# Patient Record
Sex: FEMALE | Race: WHITE | NOT HISPANIC OR LATINO | Employment: UNEMPLOYED | URBAN - METROPOLITAN AREA
[De-identification: names, ages, dates, MRNs, and addresses within clinical notes are randomized per-mention and may not be internally consistent; named-entity substitution may affect disease eponyms.]

---

## 2024-04-15 ENCOUNTER — APPOINTMENT (OUTPATIENT)
Dept: ULTRASOUND IMAGING | Facility: CLINIC | Age: 29
End: 2024-04-15
Attending: EMERGENCY MEDICINE
Payer: MEDICAID

## 2024-04-15 ENCOUNTER — HOSPITAL ENCOUNTER (EMERGENCY)
Facility: CLINIC | Age: 29
Discharge: HOME | End: 2024-04-16
Attending: EMERGENCY MEDICINE | Admitting: EMERGENCY MEDICINE
Payer: MEDICAID

## 2024-04-15 DIAGNOSIS — O09.30 LATE PRENATAL CARE: ICD-10-CM

## 2024-04-15 DIAGNOSIS — Z3A.30 30 WEEKS GESTATION OF PREGNANCY: ICD-10-CM

## 2024-04-15 DIAGNOSIS — F19.11 HISTORY OF SUBSTANCE ABUSE (H): ICD-10-CM

## 2024-04-15 DIAGNOSIS — R82.71 ASYMPTOMATIC BACTERIURIA DURING PREGNANCY: ICD-10-CM

## 2024-04-15 DIAGNOSIS — O99.891 ASYMPTOMATIC BACTERIURIA DURING PREGNANCY: ICD-10-CM

## 2024-04-15 DIAGNOSIS — O21.9 NAUSEA AND VOMITING DURING PREGNANCY: ICD-10-CM

## 2024-04-15 LAB
ALBUMIN SERPL BCG-MCNC: 3.3 G/DL (ref 3.5–5.2)
ALBUMIN UR-MCNC: NEGATIVE MG/DL
ALP SERPL-CCNC: 92 U/L (ref 40–150)
ALT SERPL W P-5'-P-CCNC: 27 U/L (ref 0–50)
AMPHETAMINES UR QL SCN: ABNORMAL
ANION GAP SERPL CALCULATED.3IONS-SCNC: 12 MMOL/L (ref 7–15)
APPEARANCE UR: ABNORMAL
AST SERPL W P-5'-P-CCNC: 30 U/L (ref 0–45)
BARBITURATES UR QL SCN: ABNORMAL
BASOPHILS # BLD AUTO: 0.1 10E3/UL (ref 0–0.2)
BASOPHILS NFR BLD AUTO: 0 %
BENZODIAZ UR QL SCN: ABNORMAL
BILIRUB SERPL-MCNC: <0.2 MG/DL
BILIRUB UR QL STRIP: NEGATIVE
BUN SERPL-MCNC: 6.4 MG/DL (ref 6–20)
BZE UR QL SCN: ABNORMAL
CALCIUM SERPL-MCNC: 9 MG/DL (ref 8.6–10)
CANNABINOIDS UR QL SCN: ABNORMAL
CHLORIDE SERPL-SCNC: 103 MMOL/L (ref 98–107)
COLOR UR AUTO: YELLOW
CREAT SERPL-MCNC: 0.41 MG/DL (ref 0.51–0.95)
DEPRECATED HCO3 PLAS-SCNC: 24 MMOL/L (ref 22–29)
EGFRCR SERPLBLD CKD-EPI 2021: >90 ML/MIN/1.73M2
EOSINOPHIL # BLD AUTO: 0.3 10E3/UL (ref 0–0.7)
EOSINOPHIL NFR BLD AUTO: 2 %
ERYTHROCYTE [DISTWIDTH] IN BLOOD BY AUTOMATED COUNT: 13.5 % (ref 10–15)
FENTANYL UR QL: ABNORMAL
GLUCOSE SERPL-MCNC: 68 MG/DL (ref 70–99)
GLUCOSE UR STRIP-MCNC: NEGATIVE MG/DL
HCG UR QL: POSITIVE
HCT VFR BLD AUTO: 29.6 % (ref 35–47)
HGB BLD-MCNC: 9.9 G/DL (ref 11.7–15.7)
HGB UR QL STRIP: NEGATIVE
IMM GRANULOCYTES # BLD: 0.2 10E3/UL
IMM GRANULOCYTES NFR BLD: 2 %
KETONES UR STRIP-MCNC: NEGATIVE MG/DL
LEUKOCYTE ESTERASE UR QL STRIP: ABNORMAL
LYMPHOCYTES # BLD AUTO: 2.8 10E3/UL (ref 0.8–5.3)
LYMPHOCYTES NFR BLD AUTO: 20 %
MCH RBC QN AUTO: 28.9 PG (ref 26.5–33)
MCHC RBC AUTO-ENTMCNC: 33.4 G/DL (ref 31.5–36.5)
MCV RBC AUTO: 86 FL (ref 78–100)
MONOCYTES # BLD AUTO: 1.3 10E3/UL (ref 0–1.3)
MONOCYTES NFR BLD AUTO: 9 %
MUCOUS THREADS #/AREA URNS LPF: PRESENT /LPF
NEUTROPHILS # BLD AUTO: 9.3 10E3/UL (ref 1.6–8.3)
NEUTROPHILS NFR BLD AUTO: 67 %
NITRATE UR QL: NEGATIVE
NRBC # BLD AUTO: 0 10E3/UL
NRBC BLD AUTO-RTO: 0 /100
OPIATES UR QL SCN: ABNORMAL
PCP QUAL URINE (ROCHE): ABNORMAL
PH UR STRIP: 6.5 [PH] (ref 5–7)
PLATELET # BLD AUTO: 330 10E3/UL (ref 150–450)
POTASSIUM SERPL-SCNC: 3.4 MMOL/L (ref 3.4–5.3)
PROT SERPL-MCNC: 6.8 G/DL (ref 6.4–8.3)
RBC # BLD AUTO: 3.43 10E6/UL (ref 3.8–5.2)
RBC URINE: 11 /HPF
SODIUM SERPL-SCNC: 139 MMOL/L (ref 135–145)
SP GR UR STRIP: 1.02 (ref 1–1.03)
SQUAMOUS EPITHELIAL: 20 /HPF
UROBILINOGEN UR STRIP-MCNC: NORMAL MG/DL
WBC # BLD AUTO: 14 10E3/UL (ref 4–11)
WBC URINE: 9 /HPF

## 2024-04-15 PROCEDURE — 80053 COMPREHEN METABOLIC PANEL: CPT | Performed by: EMERGENCY MEDICINE

## 2024-04-15 PROCEDURE — 99285 EMERGENCY DEPT VISIT HI MDM: CPT | Mod: 25 | Performed by: EMERGENCY MEDICINE

## 2024-04-15 PROCEDURE — 85025 COMPLETE CBC W/AUTO DIFF WBC: CPT | Performed by: EMERGENCY MEDICINE

## 2024-04-15 PROCEDURE — 81001 URINALYSIS AUTO W/SCOPE: CPT | Performed by: STUDENT IN AN ORGANIZED HEALTH CARE EDUCATION/TRAINING PROGRAM

## 2024-04-15 PROCEDURE — 76805 OB US >/= 14 WKS SNGL FETUS: CPT

## 2024-04-15 PROCEDURE — 87086 URINE CULTURE/COLONY COUNT: CPT | Performed by: EMERGENCY MEDICINE

## 2024-04-15 PROCEDURE — 36415 COLL VENOUS BLD VENIPUNCTURE: CPT | Performed by: EMERGENCY MEDICINE

## 2024-04-15 PROCEDURE — 81025 URINE PREGNANCY TEST: CPT | Performed by: EMERGENCY MEDICINE

## 2024-04-15 PROCEDURE — 96361 HYDRATE IV INFUSION ADD-ON: CPT | Performed by: EMERGENCY MEDICINE

## 2024-04-15 PROCEDURE — 81001 URINALYSIS AUTO W/SCOPE: CPT | Mod: XU | Performed by: EMERGENCY MEDICINE

## 2024-04-15 PROCEDURE — 80307 DRUG TEST PRSMV CHEM ANLYZR: CPT | Performed by: EMERGENCY MEDICINE

## 2024-04-15 PROCEDURE — 99284 EMERGENCY DEPT VISIT MOD MDM: CPT | Performed by: EMERGENCY MEDICINE

## 2024-04-15 PROCEDURE — 96365 THER/PROPH/DIAG IV INF INIT: CPT | Performed by: EMERGENCY MEDICINE

## 2024-04-15 RX ORDER — DEXTROSE, SODIUM CHLORIDE, SODIUM LACTATE, POTASSIUM CHLORIDE, AND CALCIUM CHLORIDE 5; .6; .31; .03; .02 G/100ML; G/100ML; G/100ML; G/100ML; G/100ML
INJECTION, SOLUTION INTRAVENOUS CONTINUOUS
Status: DISCONTINUED | OUTPATIENT
Start: 2024-04-15 | End: 2024-04-16

## 2024-04-15 RX ORDER — ONDANSETRON 4 MG/1
4 TABLET, ORALLY DISINTEGRATING ORAL ONCE
Status: COMPLETED | OUTPATIENT
Start: 2024-04-15 | End: 2024-04-16

## 2024-04-15 RX ORDER — ONDANSETRON 2 MG/ML
4 INJECTION INTRAMUSCULAR; INTRAVENOUS ONCE
Status: COMPLETED | OUTPATIENT
Start: 2024-04-15 | End: 2024-04-16

## 2024-04-15 ASSESSMENT — ENCOUNTER SYMPTOMS
CONSTIPATION: 0
NAUSEA: 1
DYSURIA: 0
DIARRHEA: 0
VOMITING: 1
WHEEZING: 0
HEMATURIA: 0
FLANK PAIN: 0
SHORTNESS OF BREATH: 0
FEVER: 0
BLOOD IN STOOL: 0

## 2024-04-15 ASSESSMENT — COLUMBIA-SUICIDE SEVERITY RATING SCALE - C-SSRS
1. IN THE PAST MONTH, HAVE YOU WISHED YOU WERE DEAD OR WISHED YOU COULD GO TO SLEEP AND NOT WAKE UP?: NO
6. HAVE YOU EVER DONE ANYTHING, STARTED TO DO ANYTHING, OR PREPARED TO DO ANYTHING TO END YOUR LIFE?: NO
2. HAVE YOU ACTUALLY HAD ANY THOUGHTS OF KILLING YOURSELF IN THE PAST MONTH?: NO

## 2024-04-15 ASSESSMENT — ACTIVITIES OF DAILY LIVING (ADL): ADLS_ACUITY_SCORE: 33

## 2024-04-16 VITALS
TEMPERATURE: 98 F | HEART RATE: 98 BPM | RESPIRATION RATE: 18 BRPM | OXYGEN SATURATION: 98 % | SYSTOLIC BLOOD PRESSURE: 125 MMHG | DIASTOLIC BLOOD PRESSURE: 73 MMHG

## 2024-04-16 PROCEDURE — 250N000011 HC RX IP 250 OP 636: Performed by: EMERGENCY MEDICINE

## 2024-04-16 RX ORDER — ONDANSETRON 4 MG/1
4 TABLET, ORALLY DISINTEGRATING ORAL EVERY 8 HOURS PRN
Qty: 15 TABLET | Refills: 2 | Status: ON HOLD | OUTPATIENT
Start: 2024-04-16 | End: 2024-05-29

## 2024-04-16 RX ORDER — CEPHALEXIN 500 MG/1
500 CAPSULE ORAL 4 TIMES DAILY
Qty: 28 CAPSULE | Refills: 0 | Status: SHIPPED | OUTPATIENT
Start: 2024-04-16 | End: 2024-04-23

## 2024-04-16 RX ORDER — CEFTRIAXONE 2 G/1
2 INJECTION, POWDER, FOR SOLUTION INTRAMUSCULAR; INTRAVENOUS ONCE
Status: COMPLETED | OUTPATIENT
Start: 2024-04-16 | End: 2024-04-16

## 2024-04-16 RX ADMIN — SODIUM CHLORIDE, SODIUM LACTATE, POTASSIUM CHLORIDE, CALCIUM CHLORIDE AND DEXTROSE MONOHYDRATE: 5; 600; 310; 30; 20 INJECTION, SOLUTION INTRAVENOUS at 00:08

## 2024-04-16 RX ADMIN — ONDANSETRON 4 MG: 4 TABLET, ORALLY DISINTEGRATING ORAL at 00:05

## 2024-04-16 RX ADMIN — CEFTRIAXONE SODIUM 2 G: 2 INJECTION, POWDER, FOR SOLUTION INTRAMUSCULAR; INTRAVENOUS at 00:50

## 2024-04-16 ASSESSMENT — ACTIVITIES OF DAILY LIVING (ADL): ADLS_ACUITY_SCORE: 35

## 2024-04-16 NOTE — ED PROVIDER NOTES
History     Chief Complaint   Patient presents with    Pregnancy Complications    Dehydration     HPI  History per patient, her mother and review of [facility] EMR and Care Everywhere EMR.    Carol Wen is a 29 year old G11 female with 3 children and 7 miscarriages who presents for pregnancy related concerns of dehydration and nausea with vomiting.  She has had no prenatal care and just started taking prenatal vitamins with folate. She presents today with her mom and partner (Glynn) for concerns of continued nausea with several episodes of emesis (most recently 2 days ago 4/13 x2) and concerns of dehydration related to poor appetite. She is able to keep down ice chips and fluids, with some nausea with solid foods. She denies abdominal pain or flank pain, vaginal bleeding, vaginal discharge, UTI signs or symptoms, hematuria, leg edema, headache, visual disturbance or neurologic deficit or abnormality.  She recently moved to [place] from [place] where she was homeless, and staying with her mother who lives in the area.  She is pregnant but does not know how far along and has a large gravid abdomen and feels quickening/fetal movement for quite some time now. LMP was over a year ago and at baseline she has irregular menstrual cycles. She estimates that she is ~6 months along.  In [place], she was not connected to care. Her first birth was in the hospital and her subsequent two were home births. She has a history of polysubstance abuse and does not have custody of her 3 children. She started on prenatal vitamins on Friday (4/12). Mom reports that she has an appointment to establish WIC as well as a OBGYN visit in ~ 1 month, the soonest she could get in.  She arrived to [place] 4 days ago after a 4 day greyhound bus ride from [place]. She reports she was abusing methamphetamine but quit this and detoxed on the bus en route to [place].  She denies any other illicit drug or medication use and denies  alcohol use or abuse.        Allergies:  Allergies   Allergen Reactions    Amoxicillin Hives    Sulfa Antibiotics Hives       Problem List:    There are no problems to display for this patient.       Past Medical History:    History reviewed. No pertinent past medical history.    Past Surgical History:    History reviewed. No pertinent surgical history.    Family History:    History reviewed. No pertinent family history.    Social History:  Marital Status:  Single [1]        Medications:    cephALEXin (KEFLEX) 500 MG capsule  ondansetron (ZOFRAN ODT) 4 MG ODT tab          Review of Systems   Constitutional:  Negative for fever.   Respiratory:  Negative for shortness of breath and wheezing.    Cardiovascular:  Negative for chest pain.   Gastrointestinal:  Positive for nausea and vomiting. Negative for blood in stool, constipation and diarrhea.   Genitourinary:  Negative for dysuria, flank pain, hematuria and vaginal bleeding.   All other systems reviewed and are negative.      Physical Exam   BP: (!) 150/78  Pulse: 119  Temp: 98  F (36.7  C)  Resp: 18  SpO2: 97 %      Physical Exam  Vitals and nursing note reviewed.   Constitutional:       General: She is not in acute distress.     Appearance: Normal appearance. She is well-developed. She is not ill-appearing, toxic-appearing or diaphoretic.   HENT:      Head: Normocephalic and atraumatic.      Right Ear: External ear normal.      Left Ear: External ear normal.      Nose: Nose normal.      Mouth/Throat:      Mouth: Mucous membranes are dry.   Eyes:      General: No scleral icterus.     Extraocular Movements: Extraocular movements intact.      Conjunctiva/sclera: Conjunctivae normal.   Neck:      Trachea: No tracheal deviation.   Cardiovascular:      Rate and Rhythm: Normal rate and regular rhythm.      Pulses: Normal pulses.      Heart sounds: Normal heart sounds. No murmur heard.     No friction rub. No gallop.   Pulmonary:      Effort: Pulmonary effort is normal.  No respiratory distress.      Breath sounds: Normal breath sounds. No wheezing, rhonchi or rales.   Abdominal:      General: There is no distension.      Palpations: Abdomen is soft.      Tenderness: There is no abdominal tenderness.      Comments: Gravid nontender abdomen.   Musculoskeletal:         General: No swelling or tenderness. Normal range of motion.      Cervical back: Normal range of motion and neck supple.      Right lower leg: No edema.      Left lower leg: No edema.   Skin:     General: Skin is warm and dry.      Coloration: Skin is not pale.      Findings: No bruising, erythema or rash.   Neurological:      General: No focal deficit present.      Mental Status: She is alert and oriented to person, place, and time.      GCS: GCS eye subscore is 4. GCS verbal subscore is 5. GCS motor subscore is 6.      Sensory: No sensory deficit.      Motor: No weakness or seizure activity.      Coordination: Coordination normal.   Psychiatric:         Attention and Perception: Attention normal.         Mood and Affect: Mood normal.         Behavior: Behavior normal.         ED Course        Procedures            Results for orders placed or performed during the hospital encounter of 04/15/24   US OB > 14 Weeks     Status: None    Narrative    ULTRASOUND OBSTETRIC LIMITED 4/16/2024 12:07 AM CDT    INDICATION: Pregnant. Unknown dates.    COMPARISON: None.    FINDINGS:     Presentation: Cephalic.  Cardiac activity: 100 bpm.  Placenta: Posterior.  Adnexa: Unremarkable.  Cervical length: The uterine cervix is not well-visualized.  Amniotic fluid: Unremarkable. Maximum vertical pocket: 4.7 cm.     Other findings: None.  A complete anatomy scan was not performed.    Measured parameters:       BPD:  7.6 cm      Age: 30 weeks 4 days.       HC:    27.3 cm      Age: 29 weeks 6 days.       AC:  27.3 cm      Age: 31 weeks 3 days.       FL:   5.5 cm      Age: 29 weeks 1 day.    Gestational age by current ultrasound measurement: 30  weeks 2 days, corresponding to an OLU of 06/23/2024. The estimated fetal weight is 1570 g +/-229 g.      Impression    IMPRESSION:    1. Single live intrauterine pregnancy of 30 weeks 2 days gestation by current ultrasound measurement. The estimated date of delivery is 06/23/2024.  2. Otherwise, unremarkable limited obstetric ultrasound.   UA with Microscopic reflex to Culture     Status: Abnormal    Specimen: Urine, Midstream   Result Value Ref Range    Color Urine Yellow Colorless, Straw, Light Yellow, Yellow    Appearance Urine Slightly Cloudy (A) Clear    Glucose Urine Negative Negative mg/dL    Bilirubin Urine Negative Negative    Ketones Urine Negative Negative mg/dL    Specific Gravity Urine 1.020 1.003 - 1.035    Blood Urine Negative Negative    pH Urine 6.5 5.0 - 7.0    Protein Albumin Urine Negative Negative mg/dL    Urobilinogen Urine Normal Normal, 2.0 mg/dL    Nitrite Urine Negative Negative    Leukocyte Esterase Urine Moderate (A) Negative    Mucus Urine Present (A) None Seen /LPF    RBC Urine 11 (H) <=2 /HPF    WBC Urine 9 (H) <=5 /HPF    Squamous Epithelials Urine 20 (H) <=1 /HPF    Narrative    Urine Culture ordered based on laboratory criteria   HCG qualitative urine     Status: Abnormal   Result Value Ref Range    hCG Urine Qualitative Positive (A) Negative   Urine Drug Screen Panel     Status: Abnormal   Result Value Ref Range    Amphetamines Urine Screen Negative Screen Negative    Barbituates Urine Screen Negative Screen Negative    Benzodiazepine Urine Screen Negative Screen Negative    Cannabinoids Urine Screen Negative Screen Negative    Cocaine Urine Screen Negative Screen Negative    Fentanyl Qual Urine Screen Negative Screen Negative    Opiates Urine Screen Positive (A) Screen Negative    PCP Urine Screen Negative Screen Negative   Comprehensive metabolic panel     Status: Abnormal   Result Value Ref Range    Sodium 139 135 - 145 mmol/L    Potassium 3.4 3.4 - 5.3 mmol/L    Carbon Dioxide  (CO2) 24 22 - 29 mmol/L    Anion Gap 12 7 - 15 mmol/L    Urea Nitrogen 6.4 6.0 - 20.0 mg/dL    Creatinine 0.41 (L) 0.51 - 0.95 mg/dL    GFR Estimate >90 >60 mL/min/1.73m2    Calcium 9.0 8.6 - 10.0 mg/dL    Chloride 103 98 - 107 mmol/L    Glucose 68 (L) 70 - 99 mg/dL    Alkaline Phosphatase 92 40 - 150 U/L    AST 30 0 - 45 U/L    ALT 27 0 - 50 U/L    Protein Total 6.8 6.4 - 8.3 g/dL    Albumin 3.3 (L) 3.5 - 5.2 g/dL    Bilirubin Total <0.2 <=1.2 mg/dL   CBC with platelets and differential     Status: Abnormal   Result Value Ref Range    WBC Count 14.0 (H) 4.0 - 11.0 10e3/uL    RBC Count 3.43 (L) 3.80 - 5.20 10e6/uL    Hemoglobin 9.9 (L) 11.7 - 15.7 g/dL    Hematocrit 29.6 (L) 35.0 - 47.0 %    MCV 86 78 - 100 fL    MCH 28.9 26.5 - 33.0 pg    MCHC 33.4 31.5 - 36.5 g/dL    RDW 13.5 10.0 - 15.0 %    Platelet Count 330 150 - 450 10e3/uL    % Neutrophils 67 %    % Lymphocytes 20 %    % Monocytes 9 %    % Eosinophils 2 %    % Basophils 0 %    % Immature Granulocytes 2 %    NRBCs per 100 WBC 0 <1 /100    Absolute Neutrophils 9.3 (H) 1.6 - 8.3 10e3/uL    Absolute Lymphocytes 2.8 0.8 - 5.3 10e3/uL    Absolute Monocytes 1.3 0.0 - 1.3 10e3/uL    Absolute Eosinophils 0.3 0.0 - 0.7 10e3/uL    Absolute Basophils 0.1 0.0 - 0.2 10e3/uL    Absolute Immature Granulocytes 0.2 <=0.4 10e3/uL    Absolute NRBCs 0.0 10e3/uL   Urine Culture     Status: None    Specimen: Urine, Midstream   Result Value Ref Range    Culture 10,000-50,000 CFU/mL Mixture of Urogenital Samia    Urine Drug Screen     Status: Abnormal    Narrative    The following orders were created for panel order Urine Drug Screen.  Procedure                               Abnormality         Status                     ---------                               -----------         ------                     Urine Drug Screen Panel[[id number]]      Abnormal            Final result                 Please view results for these tests on the individual orders.   CBC with platelets  differential     Status: Abnormal    Narrative    The following orders were created for panel order CBC with platelets differential.  Procedure                               Abnormality         Status                     ---------                               -----------         ------                     CBC with platelets and d...[[id number]]  Abnormal            Final result                 Please view results for these tests on the individual orders.           Medications   ondansetron (ZOFRAN ODT) ODT tab 4 mg (4 mg Oral $Given 24 0005)   ondansetron (ZOFRAN) injection 4 mg (4 mg Intravenous Not Given 24 0008)   cefTRIAXone (ROCEPHIN) 2 g vial to attach to  ml bag for ADULTS or NS 50 ml bag for PEDS (0 g Intravenous Stopped 24 0111)     Repeat BPs, detailed below:    Vitals:    04/15/24 2325 04/15/24 2340   BP: 124/76 114/76      Vitals:    24 0000 24 0015 24 0030 24 0045   BP: 135/78 (!) 118/94 131/85 125/73   Pulse: 103 103 98 98   Resp:       Temp:       TempSrc:       SpO2: 97% 96% 100% 98%          Initial hypertension in the ED resolved spontaneously.      Previous Records Reviewed:  [facility]     ABOR - BLOOD TYPE    Component 14 yr ago   Group and Rh A POS   Resulting Agency [facility] LABORATORY   Specimen Collected: 02/08/10  4:00 PM    Performed by: [facility] Last Resulted: 02/09/10    Received From: [facility]  Result Received: 04/15/24 11:11 PM     Assessments & Plan (with Medical Decision Making)   I have reviewed the nursing notes.    I have reviewed the findings, diagnosis, plan and need for follow up with the patient.  Pt is a 29 year old female  A7 L3 with 30-week 2-day IUP with EDC 2024 who presents today with concerns for dehydration from nausea/emesis.  No emesis in 2 days.  Mild dehydration treated with IV fluids in the emergency department  Initial BP readings elevated, repeat BPs with SBP  110-120s/DBP 70s with resolution of hypertension spontaneously.  No evidence of gestational hypertension or preeclampsia or HELLP.  UA notable for asymptomatic bacteruria, given amoxicillin allergy and pt in 3rd trimester-cephalexin choice for prophylactic antibiotic therapy.  Urine culture pending.  Urine tox screen positive for opioids.  She was counseled to avoid any drug use or abuse, and avoid alcohol.  Ultrasound with measurement estimating pt is 30 weeks and 2 days with an estimated date of delivery of 6/23/2024.  She has scheduled OB/GYN follow-up 5/15/2024, ~1 month for now.  I will make a referral to help facilitate OB/GYN follow-up sooner.  Will Rx Zofran to use if needed for nausea and Keflex for possible UTI, pending urine culture results.  She will continue prenatal vitamin with folic acid.    She and her mother were provided instructions for supportive care and will return as needed for worsened condition or worsening symptoms, or new problems or concerns.        Medical Decision Making: Moderate complexity    Discharge Medication List as of 4/16/2024  1:18 AM        START taking these medications    Details   cephALEXin (KEFLEX) 500 MG capsule Take 1 capsule (500 mg) by mouth 4 times daily for 7 days, Disp-28 capsule, R-0, E-Prescribe      ondansetron (ZOFRAN ODT) 4 MG ODT tab Take 1 tablet (4 mg) by mouth every 8 hours as needed for vomiting or nausea, Disp-15 tablet, R-2, E-Prescribe             Final diagnoses:   Late prenatal care   Nausea and vomiting during pregnancy   Asymptomatic bacteriuria during pregnancy   30 weeks gestation of pregnancy   History of substance abuse (H)     Leticia Dixon, NEDN, RN  DNP Student     4/15/2024   [facility] EMERGENCY DEPT    Medical/TEO Student  Attestation   I, Eugene Lord MD, was present with the medical/TEO student who participated in the service and in the documentation of the note. I have verified the history and personally performed the  HPI, ROS, Physical Exam and Medical Decision Making. I have amended and completed this note, including amendment of the HPI, ROS, Physical Exam and Medical Decision Making. I agree with the assessment and plan of care as documented in the note.        Eugene Lord MD  04/17/24 6225       Eugene Lord MD  04/17/24 2510

## 2024-04-16 NOTE — ED TRIAGE NOTES
Pt presents for evaluation of nausea/vomiting and unknown pregnancy. Pt believes that she may be 6 months along but does not know for sure. Is moving back to [place] from [place] to establish healthcare. HTN in triage, denies headache or visual changes.     Triage Assessment (Adult)       Row Name 04/15/24 1491          Triage Assessment    Airway WDL WDL        Respiratory WDL    Respiratory WDL WDL        Skin Circulation/Temperature WDL    Skin Circulation/Temperature WDL WDL        Cardiac WDL    Cardiac WDL WDL        Peripheral/Neurovascular WDL    Peripheral Neurovascular WDL WDL        Cognitive/Neuro/Behavioral WDL    Cognitive/Neuro/Behavioral WDL WDL

## 2024-04-17 LAB — BACTERIA UR CULT: NORMAL

## 2024-04-18 ENCOUNTER — VIRTUAL VISIT (OUTPATIENT)
Dept: OBGYN | Facility: CLINIC | Age: 29
End: 2024-04-18

## 2024-04-18 DIAGNOSIS — Z34.80 PRENATAL CARE OF MULTIGRAVIDA, ANTEPARTUM: Primary | ICD-10-CM

## 2024-04-18 PROCEDURE — 99207 PR NO CHARGE NURSE ONLY: CPT | Mod: 93

## 2024-04-18 NOTE — PROGRESS NOTES
Intake done with patient being 30 weeks already and no prenatal care. She just moved here from [place] and has history of drug and alcohol use with this pregnancy. She is living with her mother and she does not have a license to drive. She has 3 living children but does not have custody of them and they all live in [place]. She had no prenatal care with the last 2 children and delivered the babies herself at home.  Mary Ku OB Intake Nurse    Patient supplied answers from flow sheet for:  Prenatal OB Questionnaire.  Past Medical History  Have you ever recieved care for your mental health? : (!) Yes  Have you ever been in a major accident or suffered serious trauma?: No  Within the last year, has anyone hit, slapped, kicked or otherwise hurt you?: No  In the last year, has anyone forced you to have sex when you didn't want to?: No    Past Medical History 2   Have you ever received a blood transfusion?: No  Would you accept a blood transfusion if was medically recommended?: Yes  Does anyone in your home smoke?: (!) Yes (patient, FOB , and her brother)   Is your blood type Rh negative?: No  Have you ever ?: No  Have you been hospitalized for a nonsurgical reason excluding normal delivery?: No  Have you ever had an abnormal pap smear?: No    Past Medical History (Continued)  Do you have a history of abnormalities of the uterus?: No  Did your mother take ALLEN or any other hormones when she was pregnant with you?: No  Do you have any other problems we have not asked about which you feel may be important to this pregnancy?: No

## 2024-04-21 LAB
ABO/RH(D): NORMAL
ANTIBODY SCREEN: NEGATIVE
SPECIMEN EXPIRATION DATE: NORMAL

## 2024-04-22 ENCOUNTER — TRANSCRIBE ORDERS (OUTPATIENT)
Dept: MATERNAL FETAL MEDICINE | Facility: HOSPITAL | Age: 29
End: 2024-04-22

## 2024-04-22 ENCOUNTER — PRENATAL OFFICE VISIT (OUTPATIENT)
Dept: OBGYN | Facility: CLINIC | Age: 29
End: 2024-04-22
Payer: MEDICAID

## 2024-04-22 VITALS
DIASTOLIC BLOOD PRESSURE: 76 MMHG | SYSTOLIC BLOOD PRESSURE: 124 MMHG | HEIGHT: 62 IN | WEIGHT: 203.9 LBS | TEMPERATURE: 98.1 F | BODY MASS INDEX: 37.52 KG/M2 | HEART RATE: 105 BPM | RESPIRATION RATE: 16 BRPM

## 2024-04-22 DIAGNOSIS — D64.9 ANEMIA: ICD-10-CM

## 2024-04-22 DIAGNOSIS — O26.90 PREGNANCY RELATED CONDITION: Primary | ICD-10-CM

## 2024-04-22 DIAGNOSIS — Z34.93 PRENATAL CARE, THIRD TRIMESTER: Primary | ICD-10-CM

## 2024-04-22 DIAGNOSIS — Z71.89 OTHER SPECIFIED COUNSELING: Primary | Chronic | ICD-10-CM

## 2024-04-22 LAB
ALBUMIN SERPL BCG-MCNC: 3.3 G/DL (ref 3.5–5.2)
ALP SERPL-CCNC: 96 U/L (ref 40–150)
ALT SERPL W P-5'-P-CCNC: 23 U/L (ref 0–50)
AMPHETAMINES UR QL: NOT DETECTED
AST SERPL W P-5'-P-CCNC: 25 U/L (ref 0–45)
BARBITURATES UR QL SCN: NOT DETECTED
BENZODIAZ UR QL SCN: NOT DETECTED
BILIRUB DIRECT SERPL-MCNC: <0.2 MG/DL (ref 0–0.3)
BILIRUB SERPL-MCNC: <0.2 MG/DL
BUPRENORPHINE UR QL: NOT DETECTED
CANNABINOIDS UR QL: NOT DETECTED
COCAINE UR QL SCN: NOT DETECTED
CREAT SERPL-MCNC: 0.42 MG/DL (ref 0.51–0.95)
D-METHAMPHET UR QL: NOT DETECTED
EGFRCR SERPLBLD CKD-EPI 2021: >90 ML/MIN/1.73M2
ERYTHROCYTE [DISTWIDTH] IN BLOOD BY AUTOMATED COUNT: 13.8 % (ref 10–15)
FERRITIN SERPL-MCNC: 10 NG/ML (ref 6–175)
GLUCOSE 1H P 50 G GLC PO SERPL-MCNC: 86 MG/DL (ref 70–129)
HCT VFR BLD AUTO: 29.5 % (ref 35–47)
HGB BLD-MCNC: 9.4 G/DL (ref 11.7–15.7)
IRON BINDING CAPACITY (ROCHE): 598 UG/DL (ref 240–430)
IRON SATN MFR SERPL: 7 % (ref 15–46)
IRON SERPL-MCNC: 40 UG/DL (ref 37–145)
MCH RBC QN AUTO: 27.6 PG (ref 26.5–33)
MCHC RBC AUTO-ENTMCNC: 31.9 G/DL (ref 31.5–36.5)
MCV RBC AUTO: 87 FL (ref 78–100)
METHADONE UR QL SCN: NOT DETECTED
OPIATES UR QL SCN: NOT DETECTED
OXYCODONE UR QL SCN: NOT DETECTED
PCP UR QL SCN: NOT DETECTED
PLATELET # BLD AUTO: 330 10E3/UL (ref 150–450)
PROT SERPL-MCNC: 6.8 G/DL (ref 6.4–8.3)
RBC # BLD AUTO: 3.4 10E6/UL (ref 3.8–5.2)
TRICYCLICS UR QL SCN: NOT DETECTED
WBC # BLD AUTO: 13.4 10E3/UL (ref 4–11)

## 2024-04-22 PROCEDURE — 90715 TDAP VACCINE 7 YRS/> IM: CPT | Performed by: OBSTETRICS & GYNECOLOGY

## 2024-04-22 PROCEDURE — 86850 RBC ANTIBODY SCREEN: CPT | Performed by: OBSTETRICS & GYNECOLOGY

## 2024-04-22 PROCEDURE — 86901 BLOOD TYPING SEROLOGIC RH(D): CPT | Performed by: OBSTETRICS & GYNECOLOGY

## 2024-04-22 PROCEDURE — 86803 HEPATITIS C AB TEST: CPT | Performed by: OBSTETRICS & GYNECOLOGY

## 2024-04-22 PROCEDURE — 87340 HEPATITIS B SURFACE AG IA: CPT | Performed by: OBSTETRICS & GYNECOLOGY

## 2024-04-22 PROCEDURE — 82728 ASSAY OF FERRITIN: CPT | Performed by: OBSTETRICS & GYNECOLOGY

## 2024-04-22 PROCEDURE — 80306 DRUG TEST PRSMV INSTRMNT: CPT | Performed by: OBSTETRICS & GYNECOLOGY

## 2024-04-22 PROCEDURE — 86762 RUBELLA ANTIBODY: CPT | Performed by: OBSTETRICS & GYNECOLOGY

## 2024-04-22 PROCEDURE — 87591 N.GONORRHOEAE DNA AMP PROB: CPT | Performed by: OBSTETRICS & GYNECOLOGY

## 2024-04-22 PROCEDURE — 82950 GLUCOSE TEST: CPT | Performed by: OBSTETRICS & GYNECOLOGY

## 2024-04-22 PROCEDURE — 90471 IMMUNIZATION ADMIN: CPT | Performed by: OBSTETRICS & GYNECOLOGY

## 2024-04-22 PROCEDURE — 86704 HEP B CORE ANTIBODY TOTAL: CPT | Performed by: OBSTETRICS & GYNECOLOGY

## 2024-04-22 PROCEDURE — 86780 TREPONEMA PALLIDUM: CPT | Performed by: OBSTETRICS & GYNECOLOGY

## 2024-04-22 PROCEDURE — 36415 COLL VENOUS BLD VENIPUNCTURE: CPT | Performed by: OBSTETRICS & GYNECOLOGY

## 2024-04-22 PROCEDURE — 99203 OFFICE O/P NEW LOW 30 MIN: CPT | Mod: 25 | Performed by: OBSTETRICS & GYNECOLOGY

## 2024-04-22 PROCEDURE — 86900 BLOOD TYPING SEROLOGIC ABO: CPT | Performed by: OBSTETRICS & GYNECOLOGY

## 2024-04-22 PROCEDURE — 80076 HEPATIC FUNCTION PANEL: CPT | Performed by: OBSTETRICS & GYNECOLOGY

## 2024-04-22 PROCEDURE — 82565 ASSAY OF CREATININE: CPT | Performed by: OBSTETRICS & GYNECOLOGY

## 2024-04-22 PROCEDURE — 87491 CHLMYD TRACH DNA AMP PROBE: CPT | Performed by: OBSTETRICS & GYNECOLOGY

## 2024-04-22 PROCEDURE — 83540 ASSAY OF IRON: CPT | Performed by: OBSTETRICS & GYNECOLOGY

## 2024-04-22 PROCEDURE — 86706 HEP B SURFACE ANTIBODY: CPT | Performed by: OBSTETRICS & GYNECOLOGY

## 2024-04-22 PROCEDURE — 85027 COMPLETE CBC AUTOMATED: CPT | Performed by: OBSTETRICS & GYNECOLOGY

## 2024-04-22 PROCEDURE — 87389 HIV-1 AG W/HIV-1&-2 AB AG IA: CPT | Performed by: OBSTETRICS & GYNECOLOGY

## 2024-04-22 PROCEDURE — 83550 IRON BINDING TEST: CPT | Performed by: OBSTETRICS & GYNECOLOGY

## 2024-04-22 NOTE — PROGRESS NOTES
"[facility]   OB/GYN Clinic    CC: New OB     Subjective:    Carol is a 29 year old  at 31w1d by 30w2d US. LMP was over a year ago, doesn't know when it was. No LMP recorded. Patient is pregnant. who presents for her initial OB visit. This is a unplanned pregnancy and her and her partner Aleksandar \"Glynn\" are excited. She reports feeling well. Denies any uterine cramping, abdominal pain or vaginal bleeding. Denies nausea and vomiting currently, had some last week which is what caused her to go to ER for US last week. This is her 11th pregnancy, 7 were miscarriages and 3 of them were vaginal births are are living but are with someone else in [place]. The second two children she did not have prenatal care and they were born at home.   Prior to pregnancy, she reports irregular periods without contraception use.     Pt reports that she feels safe at home and her mood is irritable. Carol recently came to [place] from [place], arrived here on 24. She lives with her mom, Glynn, and her 20 yo brother. Has not been to the doctor in years. She didn't realize she was pregnant until last month, had been doing meth every day while in [place]. Has not been doing meth since she got to [place]. She had occasional alcohol use in [place] while she was pregnant as well.   Concerns: Has been having diarrhea for the last week (since she was in the ER).     ER visit was dx with UTI, unable to afford antibiotics for that now so she has not been treated yet.     Previous pregnancy complications but states that with 2 of them she had no prenatal care and delivered at home.    OB History    Para Term  AB Living   5 3 3 0 1 3   SAB IAB Ectopic Multiple Live Births   1 0 0 0 3      # Outcome Date GA Lbr Jeffrey/2nd Weight Sex Type Anes PTL Lv   5 Current            4 Term 21 40w0d   F    FUNMILAYO      Birth Comments: no prenatal care and patient delivered baby herself at home ? weight of baby      Name: Se[place]ord   3 Term " 19 40w0d  2.722 kg (6 lb) M    FUNMILAYO      Birth Comments: no prenatal care and patient delivered infant herself at home ? weight of 6 pounds      Name: Chan Cardona Term 18 40w0d  3.062 kg (6 lb 12 oz) M    FUNMILAYO      Name: Manuel Benavides SAB      SAB         Obstetric Comments   Patient states she has had 7 miscarriages before 2018       Past Medical History:   Diagnosis Date    Anxiety     Asthma     as teenager    Chickenpox     x3    Depression     PTSD (post-traumatic stress disorder)     Strabismus     Urinary tract infection     with this pregnancy       Past Surgical History:   Procedure Laterality Date    EYE SURGERY      strabimus at 9 months       Current Outpatient Medications   Medication Sig Dispense Refill    ondansetron (ZOFRAN ODT) 4 MG ODT tab Take 1 tablet (4 mg) by mouth every 8 hours as needed for vomiting or nausea 15 tablet 2    Prenatal Vit-Fe Fumarate-FA (PRENATAL MULTIVITAMIN  PLUS IRON) 27-1 MG TABS Take 1 tablet by mouth daily      cephALEXin (KEFLEX) 500 MG capsule Take 1 capsule (500 mg) by mouth 4 times daily for 7 days (Patient not taking: Reported on 2024) 28 capsule 0       Family History   Problem Relation Age of Onset    Anemia Mother     Kidney Disease Mother     Depression Mother     Autism Spectrum Disorder Brother     Depression Brother     Attention Deficit Disorder Brother     Dementia Maternal Grandmother     Post-Traumatic Stress Disorder (PTSD) Maternal Grandmother     Depression Maternal Grandmother     Diabetes Maternal Grandfather     Heart Surgery Maternal Grandfather     Hypertension Maternal Grandfather     Heart Surgery Paternal Grandmother     Diabetes Paternal Grandmother        Social History     Tobacco Use    Smoking status: Every Day     Current packs/day: 1.00     Types: Cigarettes    Smokeless tobacco: Never    Tobacco comments:     Down to 10 cig/day with pregnancy   Substance Use Topics    Alcohol use: Not Currently     Comment: unsure of  "quit date not drinking since found out she was pregnant       ROS: A 10 pt ROS was completed and found to be negative unless mentioned in the HPI.     Objective:  /76 (BP Location: Left arm, Patient Position: Sitting, Cuff Size: Adult Regular)   Pulse 105   Temp 98.1  F (36.7  C)   Resp 16   Ht 1.562 m (5' 1.5\")   Wt 92.5 kg (203 lb 14.4 oz)   BMI 37.90 kg/m      Estimated body mass index is 37.9 kg/m  as calculated from the following:    Height as of this encounter: 1.562 m (5' 1.5\").    Weight as of this encounter: 92.5 kg (203 lb 14.4 oz).    Physical Exam:  Gen: Pleasant, talkative female in no apparent distress   Endocrine: Thyroid without enlargement or nodularity   Lymph: no appreciable cervical lymphadenopathy  Respiratory: Lungs clear, breathing comfortably on room air   Cardiac: Regular rate and rhythm with no murmurs, gallops or rubs. Warm and well-perfused.   GI: Abd soft and non-tender  MSK: Grossly normal movement of all four extremities  Psych: mood and affect bright   Lower extremity: edema not present     Fetal dop tones: 31 bpm  Fundal height: 130s    Assessment/Plan:   29 year old  at 31w1d by 30w2d US who presents for her initial OB visit.   1) Plan to draw new OB lab today (T&S, CBC, HIV, RPR, HepBsAg, Hep B antibody, rubella, GC/Chlam, UC)  2) level 2 ordered  3) TDAP next visit (had planned today but had to get down for 1h blood draw)  4) elevated BP in ED- HELLP labs pending  5) substance/meth use in pregnancy- states has been sober since moving to [place], care coordination referral placed, UDS pending  6) scant/late prenatal care- reviewed importance of being seen  7) third tri labs pending today  8) given abx of UTI in ED, didn't take, culture returned negative so treatment not indicated, no sx today    RTC 2 weeks    This patient was seen and staffed with my attending physician.   Lio Johnson, MS3  [employer]    I was present with the student who " participated in the service and in the documentation of the note. I have verified the history and personally performed the physical exam and medical decision-making. I agree with the assessment and plan of care as documented in the note

## 2024-04-23 ENCOUNTER — PATIENT OUTREACH (OUTPATIENT)
Dept: CARE COORDINATION | Facility: CLINIC | Age: 29
End: 2024-04-23
Payer: MEDICAID

## 2024-04-23 ENCOUNTER — PRE VISIT (OUTPATIENT)
Dept: MATERNAL FETAL MEDICINE | Facility: HOSPITAL | Age: 29
End: 2024-04-23
Payer: MEDICAID

## 2024-04-23 LAB
C TRACH DNA SPEC QL NAA+PROBE: POSITIVE
HBV CORE AB SERPL QL IA: NONREACTIVE
HBV SURFACE AB SERPL IA-ACNC: <3.5 M[IU]/ML
HBV SURFACE AB SERPL IA-ACNC: NONREACTIVE M[IU]/ML
HBV SURFACE AG SERPL QL IA: NONREACTIVE
HCV AB SERPL QL IA: NONREACTIVE
HIV 1+2 AB+HIV1 P24 AG SERPL QL IA: NONREACTIVE
N GONORRHOEA DNA SPEC QL NAA+PROBE: NEGATIVE
RUBV IGG SERPL QL IA: 1.18 INDEX
RUBV IGG SERPL QL IA: POSITIVE
T PALLIDUM AB SER QL: NONREACTIVE

## 2024-04-23 NOTE — PROGRESS NOTES
"Clinic Care Coordination Contact  Community Health Worker Initial Outreach    Chart Review: Referral from OB provider Help with getting things for baby- just moved from [place] - late prenatal care.     CHW Initial Information Gathering:  Referral Source: PCP  Current living arrangement:: I live in a private home with family  Community Resources: None  Supplies Currently Used at Home: None  Equipment Currently Used at Home: none  Informal Support system:: Family, Partner  No PCP office visit in Past Year: Yes  Transportation means:: Family  CHW Additional Questions  If ED/Hospital discharge, follow-up appointment scheduled as recommended?: N/A  Medication changes made following ED/Hospital discharge?: N/A  MyChart active?: No    CHW introduced self and role with CC  Patients partner answered and handed the phone to patient. She states that they do not have anything for the baby, she just \"showed up\" to her moms house in [place] from [place] and sprung that she was pregnant on them.   FRW referral has already been placed.   Patient is needing supplies for baby and transportation, she is not sure that she will have a ride to her appointment on Friday.     Patient accepts CC: Yes. Patient scheduled for assessment with CC on 4/24 at 11:00am. Patient noted desire to discuss pregnancy resources, transportation, insurance, etc.   Charlene Tripathi, MARK, B.S. [facility]  Clinic Care Coordination  [facility] Clinics:  [place], [place] and [place]  [phone]  [email or web address]          "

## 2024-04-24 ENCOUNTER — PATIENT OUTREACH (OUTPATIENT)
Dept: CARE COORDINATION | Facility: CLINIC | Age: 29
End: 2024-04-24

## 2024-04-24 ENCOUNTER — PATIENT OUTREACH (OUTPATIENT)
Dept: NURSING | Facility: CLINIC | Age: 29
End: 2024-04-24
Payer: MEDICAID

## 2024-04-24 DIAGNOSIS — A74.9 CHLAMYDIA INFECTION AFFECTING PREGNANCY IN THIRD TRIMESTER: Primary | ICD-10-CM

## 2024-04-24 DIAGNOSIS — O98.813 CHLAMYDIA INFECTION AFFECTING PREGNANCY IN THIRD TRIMESTER: Primary | ICD-10-CM

## 2024-04-24 RX ORDER — AZITHROMYCIN 250 MG/1
1000 TABLET, FILM COATED ORAL DAILY
Qty: 8 TABLET | Refills: 0 | Status: SHIPPED | OUTPATIENT
Start: 2024-04-24 | End: 2024-04-25

## 2024-04-24 ASSESSMENT — ACTIVITIES OF DAILY LIVING (ADL): DEPENDENT_IADLS:: INDEPENDENT

## 2024-04-24 NOTE — PROGRESS NOTES
"Clinic Care Coordination Contact  Clinic Care Coordination Contact  OUTREACH    Referral Information:  Referral Source: PCP    Primary Diagnosis: Pregnancy    Chief Complaint   Patient presents with    Clinic Care Coordination - Initial        Universal Utilization:   Clinic Utilization  Difficulty keeping appointments: No  Compliance Concerns: No  No-Show Concerns: No  No PCP office visit in Past Year: Yes    Utilization      No Show Count (past year)  0             ED Visits  1             Hospital Admissions  0                    Current as of: 4/24/2024 11:29 AM                Clinical Concerns:  Current Medical Concerns: Late to prenatal care, late to find out about pregnancy, currently 31W3D      Patient Active Problem List   Diagnosis    Prenatal care, subsequent pregnancy       Current Behavioral Concerns: Recent [place] use      Education Provided to patient: role of SW CC and clinic care coordination, pregnancy resources, baby resources, FRW      Order: Financial Support; Resources for Transportation; Insurance; Help with getting things for baby- just moved from [place] - late prenatal care; Housing.     CHW: Patient noted desire to discuss pregnancy resources, transportation, insurance, etc. CHW introduced self and role with CC  Patients partner answered and handed the phone to patient. She states that they do not have anything for the baby, she just \"showed up\" to her moms house in [place] from [place] and sprung that she was pregnant on them.   FRW referral has already been placed.   Patient is needing supplies for baby and transportation, she is not sure that she will have a ride to her appointment on Friday.     OB OV 4/22/24: Patient is pregnant. who presents for her initial OB visit. This is a unplanned pregnancy and her and her partner Aleksandar \"Glynn\" are excited. She reports feeling well. Denies any uterine cramping, abdominal pain or vaginal bleeding. Denies nausea and vomiting currently, had some last " week which is what caused her to go to ER for US last week. This is her 11th pregnancy, 7 were miscarriages and 3 of them were vaginal births are are living but are with someone else in [place]. The second two children she did not have prenatal care and they were born at home.   Prior to pregnancy, she reports irregular periods without contraception use.      Pt reports that she feels safe at home and her mood is irritable. Carol recently came to [place] from [place], arrived here on 4/11/24. She lives with her mom, Glynn, and her 20 yo brother. Has not been to the doctor in years. She didn't realize she was pregnant until last month, had been doing meth every day while in [place]. Has not been doing meth since she got to [place]. She had occasional alcohol use in [place] while she was pregnant as well.     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 FRW established contact with pt and applied for Medicaid. Pt is approved for MA as of 4/1 with no verification.     --------------------------------------------------------------    DAGO CC outreach to pt for initial assessment per CHW scheduling.     - [facility] side   - new to [place], staying with mother and partner  - no [facility] PCP    Discussed FRW referral made - michael fraga MA 4/1/24, [place] today. FRW to follow up regarding SNAP/CASH application in 1 month.     Discussed transportation to appt on Friday in [place]. Educated pt on MNET for medical cabs to appts. Pt will need to call for ride today #[phone]. Pt wrote down #, address, arrival time, and PMI. SW CC explained this is most cost effective/covered option due to crossing county lines. Will also send via e-mail.     [address] [employer] [place], [place]  "[id number]  10:15 am arrival time   PMI [id number]    Discussed that pt needs things for baby. States she \"dropped the ball on her mom 2 days before she got on bus and finding out she is already 31 weeks pregnant.\" She has nothing for baby so far. DAGO WELSH to gather info and send e-mail - [email or web address].     Declined [facility] home visiting referral. Advised pt can change her mind at any point and referral can be made.     Pt has been in contact with [facility]. States she was supposed to get more info to them about her insurance and such. She will follow up. DAGO CC to send contact info.     Pain  Pain: Not discussed     Health Maintenance Reviewed: Due/Overdue     Health Maintenance Due   Topic Date Due    NICOTINE/TOBACCO CESSATION COUNSELING Q 1 YR  Never done    YEARLY PREVENTIVE VISIT  Never done    ADVANCE CARE PLANNING  Never done    Pneumococcal Vaccine: Pediatrics (0 to 5 Years) and At-Risk Patients (6 to 64 Years) (1 of 2 - PCV) Never done    PAP  01/24/2015    DTAP/TDAP/TD IMMUNIZATION (7 - Td or Tdap) 08/16/2016    INFLUENZA VACCINE (1) Never done    COVID-19 Vaccine (1 - 2023-24 season) Never done    MATERNAL SCREENING DISCUSSION  Never done    PHQ-2 (once per calendar year)  Never done       Clinical Pathway: None    Medication Management:  Medication review status: Medications reviewed and no changes reported per patient.           Functional Status:  Dependent ADLs: Independent  Dependent IADLs: Independent  Bed or wheelchair confined: No  Mobility Status: Independent  Fallen 2 or more times in the past year?: No  Any fall with injury in the past year?: No    Living Situation:  Current living arrangement: I live in a private home with family  Type of residence: Private home - stairs    Lifestyle & Psychosocial Needs:    Social Determinants of Health     Food Insecurity: Not on file   Depression: Not on file   Housing Stability: Not on file   Tobacco Use: High Risk (4/22/2024)    " Patient History     Smoking Tobacco Use: Every Day     Smokeless Tobacco Use: Never     Passive Exposure: Not on file   Financial Resource Strain: Not on file   Alcohol Use: Not on file   Transportation Needs: Not on file   Physical Activity: Not on file   Interpersonal Safety: Not on file   Stress: Not on file   Social Connections: Not on file   Health Literacy: Not on file       Diet: Regular  Inadequate nutrition: No  Tube Feeding: No  Inadequate activity/exercise: No  Significant changes in sleep pattern: No  Transportation means: Family     Sabianist or spiritual beliefs that impact treatment: No  Mental health DX: No  Mental health management concern: No  Chemical Dependency Status: Past Concern  Informal Support system: Family, Partner     Care Coordinator has reviewed patient's Social Determinants of Health (SDoH) on this date. Upon review, changes were not made.      Resources and Interventions:  Current Resources:   Community Resources: Financial/Insurance, County Programs  Supplies Currently Used at Home: None  Equipment Currently Used at Home: none  Employment Status: unemployed     Advance Care Plan/Directive  Advanced Care Plans/Directives on file: No  Discussed with patient/caregiver: Referral to Honoring Choices    Referrals Placed: Other, Financial Services, Community Resources     Care Plan:  Care Plan: General       Problem: Baby resources       Goal: Baby resources       Start Date: 4/24/2024 Expected End Date: 8/1/2024    Priority: High    Note:     Barriers: Access, just found out she is pregnant at ~30 weeks  Strengths: Has MA now, motivated   Patient expressed understanding of goal: Yes    Action steps to achieve this goal:  1. I will call local baby resources for supplies.    2. I will call WIC to get enrolled.   3. I will work with care coordination team as needed.                                Patient/Caregiver understanding: Pt reports understanding and denies any additional questions or  "concerns at this times. SW CC engaged in AIDET communication during encounter.    Outreach Frequency: Monthly, more frequently as needed    Future Appointments                In 2 days ROSENDO BURROWS 1 [facility]    In 2 days ROSENDO GROSS MD [facility]    In 1 week Misty Jay MD [facility]            Plan: Patient was provided with this writer's contact information and encouraged to call with any questions or concerns.     SW CC will send resources, care coordination introduction letter, and care plan to patient. SW CC to chart review or outreach in 1-2 weeks.     CHW to outreach in 1-2 weeks.     Sent:  \"Baby items:   https://www.diaperbank[place].org/find-diapers/  https://epiphanycaringforlife.[employer]/services  https://www.[facility] [place].org/babyblanketbeyond  https://[facility].org/get-assistance/wellness-baskets/    Appt on Friday:   [address] [address]  10:15 am arrival time  PMI Medicaid #[id number]  MNET for medical cabs: #[phone]  Call a few days before any medical appts you need rides to     [facility]:   [email or web address]  Monday - Friday from 8 am - 4:00 pm  #[phone]\"    DEVON Fay   Social Work Primary Care Clinic Care Coordinator   [facility]  [phone]  [email or web address]   "

## 2024-04-24 NOTE — LETTER
[facility]  Patient Centered Plan of Care  About Me:        Patient Name:  Carol Wen    YOB: 1995  Age:         29 year old   [place] MRN:    [id number] Telephone Information:  Home Phone [phone]   Mobile [phone]       Address:  [address] [place] [address] Email address:  No e-mail address on record      Emergency Contact(s)    Name Relationship Lgl Grd Work Phone Home Phone Mobile Phone   1. DASHA POWELL Father   [phone]            Primary language:  English     needed? No   [facility] [employer]:  [phone] op. 1  Other communication barriers:None  Preferred Method of Communication:     Current living arrangement: I live in a private home with family  Mobility Status/ Medical Equipment: Independent    Health Maintenance  Health Maintenance Reviewed: Due/Overdue   Health Maintenance Due   Topic Date Due    NICOTINE/TOBACCO CESSATION COUNSELING Q 1 YR  Never done    YEARLY PREVENTIVE VISIT  Never done    ADVANCE CARE PLANNING  Never done    Pneumococcal Vaccine: Pediatrics (0 to 5 Years) and At-Risk Patients (6 to 64 Years) (1 of 2 - PCV) Never done    PAP  01/24/2015    DTAP/TDAP/TD IMMUNIZATION (7 - Td or Tdap) 08/16/2016    INFLUENZA VACCINE (1) Never done    COVID-19 Vaccine (1 - 2023-24 season) Never done    MATERNAL SCREENING DISCUSSION  Never done    PHQ-2 (once per calendar year)  Never done       My Access Plan  Medical Emergency 911   Primary Clinic Line [facility] - [phone]   24 Hour Appointment Line [phone] or  [phone]) (toll-free)   24 Hour Nurse Line [phone] (toll-free)   Preferred Urgent Care [facility], [phone]     Preferred Hospital [place] [place]  [phone]     Preferred Pharmacy [facility] [place] [address]     Behavioral Health Crisis Line The National Suicide Prevention Lifeline at  5-761-543-5738 or Text/Call 688           My Care Team Members  Patient Care Team         Relationship Specialty Notifications Start End    Clinic, [facility] PCP - General   4/15/24     Phone: [phone] Fax: [phone]         [address] [facility] [address] [facility] [place] [address]    Dorinda Ramirez MD MD OB/Gyn  4/16/24     Phone: [phone] Fax: [phone]         [address] [facility] [place] [address]    Dhara Brooke CHW Community Health Worker Primary Care - CC Admissions 4/22/24     Maria R Shepherd MA Financial Resource Worker   4/23/24     Phone: [phone]         Leticia Pitt LSW Lead Care Coordinator Primary Care - CC Admissions 4/23/24     Phone: [phone]          [address] [facility] [place] [address]                My Care Plans  Self Management and Treatment Plan    Care Plan  Care Plan: General       Problem: Baby resources       Goal: Baby resources       Start Date: 4/24/2024 Expected End Date: 8/1/2024    Priority: High    Note:     Barriers: Access, just found out she is pregnant at ~30 weeks  Strengths: Has MA now, motivated   Patient expressed understanding of goal: Yes    Action steps to achieve this goal:  1. I will call local baby resources for supplies.    2. I will call WIC to get enrolled.   3. I will work with care coordination team as needed.                                Action Plans on File:                       Advance Care Plans/Directives:   Advanced Care Plan/Directives on file:   No    Discussed with patient/caregiver(s):   Referral to Honoring Choices         Honoring Choices    Advance Care Planning and Health Care Directives  When it comes to decisions about your health care, it s important that your voice is heard. You may not always be able to speak for yourself.    We encourage you to have discussions with your loved ones, cultural or spiritual leaders and health care providers about your goals, values, beliefs and choices.    We are a part of Honoring  [employer] [place] , supporting and promoting the benefits of advance care planning conversations.    Our goals are to:  Help you make informed decisions about your healthcare choices and ensure that those choices are honored  Offer advance care planning discussions with trained staff  Ensure your choices are clearly defined, documented and available in your medical record  Translate your choices into medical orders as needed    Why is Advance Care Planning important?  Know what your health care choices are and decide what is right for you  An unexpected illness or injury could leave you unable to participate in important treatment decisions  When choices are left to others to decide that responsibility can be difficult and stressful  By discussing and outlining your choices, your voice is heard in the care you want to receive    How can I learn more?  We offer free classes at multiple locations, days and times. Our trained facilitators will provide information and guide you through a Health Care Directive document. They can also review, notarize and add your document to your medical record.    Call [facility] at [phone] or toll free at [phone] for assistance.        My Medical and Care Information  Problem List   Patient Active Problem List   Diagnosis    Prenatal care, subsequent pregnancy      Current Medications and Allergies:   Current Outpatient Medications   Medication Instructions    azithromycin (ZITHROMAX) 1,000 mg, Oral, DAILY, Take 4 tablets one time and give other 4 tablets to your partner to take.    ondansetron (ZOFRAN ODT) 4 mg, Oral, EVERY 8 HOURS PRN    Prenatal Vit-Fe Fumarate-FA (PRENATAL MULTIVITAMIN  PLUS IRON) 27-1 MG TABS 1 tablet, Oral, DAILY     Care Coordination Start Date: 4/22/2024   Frequency of Care Coordination: monthly, more frequently as needed     Form Last Updated: 04/24/2024

## 2024-04-24 NOTE — RESULT ENCOUNTER NOTE
Pt notified of below.  Pt reports understanding.  Pt does not have further questions or concerns.  Report filed with [place] Department of Health     Margot Harris   Ob/Gyn Clinic  RN

## 2024-04-24 NOTE — LETTER
[facility] CARE COORDINATION  [facility]  [address]  [place] [address]    April 24, 2024    Carol Wen  [address] [place] [address]      Dear Carol,    I am a clinic care coordinator who works with [facility] with the [facility]. I wanted to thank you for spending the time to talk with me.  Below is a description of clinic care coordination and how I can further assist you.       The clinic care coordination team is made up of a registered nurse, , financial resource worker and community health worker who understand the health care system. The goal of clinic care coordination is to help you manage your health and improve access to the health care system. Our team works alongside your provider to assist you in determining your health and social needs. We can help you obtain health care and community resources, providing you with necessary information and education. We can work with you through any barriers and develop a care plan that helps coordinate and strengthen the communication between you and your care team.  Our services are voluntary and are offered without charge to you personally.    Please feel free to contact me with any questions or concerns regarding care coordination and what we can offer.      We are focused on providing you with the highest-quality healthcare experience possible.    Sincerely,     Leticia Pitt, [facility]   Social Work Primary Care Clinic Care Coordinator   [facility]  [phone]  [email or web address]     Enclosed: I have enclosed a copy of the Patient Centered Plan of Care. This has helpful information and goals that we have talked about. Please keep this in an easy to access place to use as needed.

## 2024-05-05 ENCOUNTER — HOSPITAL ENCOUNTER (EMERGENCY)
Facility: CLINIC | Age: 29
Discharge: HOME | End: 2024-05-05
Attending: NURSE PRACTITIONER | Admitting: NURSE PRACTITIONER
Payer: MEDICAID

## 2024-05-05 VITALS
TEMPERATURE: 98.9 F | HEART RATE: 111 BPM | OXYGEN SATURATION: 98 % | SYSTOLIC BLOOD PRESSURE: 118 MMHG | RESPIRATION RATE: 18 BRPM | DIASTOLIC BLOOD PRESSURE: 57 MMHG

## 2024-05-05 DIAGNOSIS — U07.1 INFECTION DUE TO 2019 NOVEL CORONAVIRUS: ICD-10-CM

## 2024-05-05 DIAGNOSIS — U07.1 COVID-19 VIRUS INFECTION: ICD-10-CM

## 2024-05-05 LAB
FLUAV RNA SPEC QL NAA+PROBE: NEGATIVE
FLUBV RNA RESP QL NAA+PROBE: NEGATIVE
RSV RNA SPEC NAA+PROBE: NEGATIVE
SARS-COV-2 RNA RESP QL NAA+PROBE: POSITIVE

## 2024-05-05 PROCEDURE — 99213 OFFICE O/P EST LOW 20 MIN: CPT | Performed by: NURSE PRACTITIONER

## 2024-05-05 PROCEDURE — G0463 HOSPITAL OUTPT CLINIC VISIT: HCPCS

## 2024-05-05 PROCEDURE — 87637 SARSCOV2&INF A&B&RSV AMP PRB: CPT

## 2024-05-05 ASSESSMENT — ACTIVITIES OF DAILY LIVING (ADL): ADLS_ACUITY_SCORE: 35

## 2024-05-05 ASSESSMENT — COLUMBIA-SUICIDE SEVERITY RATING SCALE - C-SSRS
6. HAVE YOU EVER DONE ANYTHING, STARTED TO DO ANYTHING, OR PREPARED TO DO ANYTHING TO END YOUR LIFE?: NO
1. IN THE PAST MONTH, HAVE YOU WISHED YOU WERE DEAD OR WISHED YOU COULD GO TO SLEEP AND NOT WAKE UP?: NO
2. HAVE YOU ACTUALLY HAD ANY THOUGHTS OF KILLING YOURSELF IN THE PAST MONTH?: NO

## 2024-05-05 NOTE — ED TRIAGE NOTES
Pt is pregnant and has not been feeling well - mom is covid positive and would like to be tested today.

## 2024-05-05 NOTE — ED PROVIDER NOTES
History     Chief Complaint   Patient presents with    Cough     HPI  aCrol Wen is a 29 year old female who is 33 weeks pregnant who presents for evaluation of 1 episode of vomiting and exposure to COVID-19 infection from her boyfriend and her mother.  Denies significant congestion or cough.  Denies chest pain or shortness of breath.  She has a prescription for Zofran that she is going to  for her nausea.  She wanted to be tested for COVID-19 infection here today.  She just completed azithromycin 1000 mg and Ceftriaxone 2 gm IM when seen in clinic with concern of STD.  She tested positive for chlamydia.  Negative for gonorrhea.  She is a current everyday smoker.  She has a history of asthma.  Allergies:  Allergies   Allergen Reactions    Amoxicillin Hives    Sulfa Antibiotics Hives       Problem List:    Patient Active Problem List    Diagnosis Date Noted    Prenatal care, subsequent pregnancy 04/18/2024     Priority: Medium        Past Medical History:    Past Medical History:   Diagnosis Date    Anxiety     Asthma     Chickenpox     Depression     PTSD (post-traumatic stress disorder)     Strabismus     Urinary tract infection        Past Surgical History:    Past Surgical History:   Procedure Laterality Date    EYE SURGERY      strabimus at 9 months       Family History:    Family History   Problem Relation Age of Onset    Anemia Mother     Kidney Disease Mother     Depression Mother     Autism Spectrum Disorder Brother     Depression Brother     Attention Deficit Disorder Brother     Dementia Maternal Grandmother     Post-Traumatic Stress Disorder (PTSD) Maternal Grandmother     Depression Maternal Grandmother     Diabetes Maternal Grandfather     Heart Surgery Maternal Grandfather     Hypertension Maternal Grandfather     Heart Surgery Paternal Grandmother     Diabetes Paternal Grandmother        Social History:  Marital Status:  Single [1]  Social History     Tobacco Use    Smoking status:  Every Day     Current packs/day: 1.00     Types: Cigarettes    Smokeless tobacco: Never    Tobacco comments:     Down to 10 cig/day with pregnancy   Vaping Use    Vaping status: Never Used   Substance Use Topics    Alcohol use: Not Currently     Comment: unsure of quit date not drinking since found out she was pregnant    Drug use: Not Currently     Types: Methamphetamines     Comment: ? last use 3-4 weeks ago        Medications:    nirmatrelvir and ritonavir (PAXLOVID) 300 mg/100 mg therapy pack  ondansetron (ZOFRAN ODT) 4 MG ODT tab  Prenatal Vit-Fe Fumarate-FA (PRENATAL MULTIVITAMIN  PLUS IRON) 27-1 MG TABS          Review of Systems  As mentioned above in the history present illness. All other systems were reviewed and are negative.    Physical Exam   BP: 118/57  Pulse: 111  Temp: 98.9  F (37.2  C)  Resp: 18  SpO2: 98 %      Physical Exam  Constitutional:       General: She is not in acute distress.     Appearance: Normal appearance. She is well-developed. She is not ill-appearing.   HENT:      Head: Normocephalic and atraumatic.      Right Ear: External ear normal.      Left Ear: External ear normal.      Nose: Nose normal.      Mouth/Throat:      Mouth: Mucous membranes are moist.   Eyes:      Conjunctiva/sclera: Conjunctivae normal.   Cardiovascular:      Rate and Rhythm: Normal rate and regular rhythm.      Heart sounds: Normal heart sounds. No murmur heard.  Pulmonary:      Effort: Pulmonary effort is normal. No respiratory distress.      Breath sounds: Normal breath sounds.   Musculoskeletal:         General: Normal range of motion.   Skin:     General: Skin is warm and dry.      Findings: No rash.   Neurological:      General: No focal deficit present.      Mental Status: She is alert and oriented to person, place, and time.         ED Course        Procedures              Results for orders placed or performed during the hospital encounter of 05/05/24 (from the past 24 hour(s))   Symptomatic Influenza  A/B, RSV, & SARS-CoV2 PCR (COVID-19) Nose    Specimen: Nose; Swab   Result Value Ref Range    Influenza A PCR Negative Negative    Influenza B PCR Negative Negative    RSV PCR Negative Negative    SARS CoV2 PCR Positive (A) Negative    Narrative    Testing was performed using the Xpert Xpress CoV2/Flu/RSV Assay on the OneTag GeneXpert Instrument. This test should be ordered for the detection of SARS-CoV-2, influenza, and RSV viruses in individuals who meet clinical and/or epidemiological criteria. Test performance is unknown in asymptomatic patients. This test is for in vitro diagnostic use under the FDA EUA for laboratories certified under CLIA to perform high or moderate complexity testing. This test has not been FDA cleared or approved. A negative result does not rule out the presence of PCR inhibitors in the specimen or target RNA in concentration below the limit of detection for the assay. If only one viral target is positive but coinfection with multiple targets is suspected, the sample should be re-tested with another FDA cleared, approved, or authorized test, if coinfection would change clinical management. This test was validated by the [facility]. These laboratories are certified under the Clinical Laboratory Improvement Amendments of 1988 (CLIA-88) as qualified to perform high complexity laboratory testing.       Medications - No data to display    Assessments & Plan (with Medical Decision Making)   Patient is positive for COVID-19 infection.  She has no respiratory distress.  Exam is unremarkable.  Given her high risk status with being 34 weeks pregnant and history of asthma she does meet criteria for treatment with Paxlovid.  She has only had nausea with 1 episode of vomiting that started today.  I discussed treatment with Paxlovid with patient.  She has no contraindication as far as medications.  She does not have any history of kidney problems.  Patient was in agreement and would  like to proceed with treatment with Paxlovid.      Discharge Medication List as of 5/5/2024 12:47 PM        START taking these medications    Details   nirmatrelvir and ritonavir (PAXLOVID) 300 mg/100 mg therapy pack Take 3 tablets by mouth 2 times daily for 5 days Take 2 Nirmatrelvir tablets and 1 Ritonavir tablet twice daily for 5 days., Disp-30 tablet, R-0, E-PrescribeIf Paxlovid unavailable and no Molnupiravir ordered, refer patient to MNRAP at [email or web address]. If Molnupiravir ordered along with Paxlovid, dispense Molnupiravir and review embryotoxicity.             Final diagnoses:   COVID-19 virus infection   Infection due to 2019 novel coronavirus       5/5/2024   [facility] EMERGENCY DEPT       Issa, KAN Appiah CNP  05/05/24 2902

## 2024-05-06 ENCOUNTER — PATIENT OUTREACH (OUTPATIENT)
Dept: CARE COORDINATION | Facility: CLINIC | Age: 29
End: 2024-05-06
Payer: MEDICAID

## 2024-05-06 ENCOUNTER — TELEPHONE (OUTPATIENT)
Dept: OBGYN | Facility: CLINIC | Age: 29
End: 2024-05-06

## 2024-05-06 NOTE — TELEPHONE ENCOUNTER
[facility] Call Center    Phone Message    May a detailed message be left on voicemail: yes     Reason for Call: Other: Pt called to schedule follow up OB visit. Pt states she was seen recently at [facility] for her initial prenatal visits. Chart doesn't show any visits, pt states she had a duplicate chart that was supposed to be merged with this chart. Writer doesn't see 2nd chart and nothing has been merged. Writer was unsure if pt should be scheduled for a follow up or not due to no info in this chart. Please advise and call pt     Action Taken: Message routed to:  Other: [place] [facility]    Travel Screening: Not Applicable

## 2024-05-06 NOTE — PROGRESS NOTES
Clinic Care Coordination Contact    Situation: Patient chart reviewed by care coordinator.    Background: Pt presented to [facility] ED 5/5/24 for evaluation of COVID-19 symptoms.     Assessment: Pt tested positive for COVID-190. Rx given. Discharged home.     Plan/Recommendations: [facility] CC and CHW to outreach as previously indicated.     DEVON Fay   Social Work Primary Care Clinic Care Coordinator   [facility]  [phone]  [email or web address]

## 2024-05-06 NOTE — TELEPHONE ENCOUNTER
Patient scheduled for 5/13/2024 with Dr. Piper for an appointment on other patient record. ( MRN: [id number]  )     Margot HAYNES   Ob/Gyn Clinic

## 2024-05-10 NOTE — PATIENT INSTRUCTIONS
Weeks 34 to 36 of Your Pregnancy: Care Instructions  Your belly has grown quite large. It's almost time to give birth! Your baby's lungs are almost ready to breathe air. The skull bones are firm enough to protect your baby's head. But they're soft enough to move down through the birth canal.    You might be wondering what to expect during labor. Because each birth is different, there's no way to know exactly what childbirth will be like for you. Talk to your doctor or midwife about any concerns you have.    You'll probably have a test for group B streptococcus (GBS). GBS is bacteria that can live in the vagina and rectum. GBS can make your baby sick after birth. If you test positive, you'll get antibiotics during labor.    Choose what type of pain relief you would like during labor.  You can choose from a few types, including medicine and non-medicine options. You may want to use several types of pain relief.     Know how medicines can help with pain during labor.  Some medicines lower anxiety and help with some of the pain. Others make your lower body numb so that you will feel less pain.     Tell your doctor about your pain medicine choice.  Do this before you start labor or very early in your labor. You may be able to change your mind during labor.     Learn about the stages of labor.    The first stage includes the early (latent) and active phases of labor. Contractions start in early labor. During active labor, contractions get stronger, last longer, and happen more often. And the cervix opens more rapidly.  The second stage starts when you're ready to push. During this stage, your baby is born.  During the third stage, you'll usually have a few more contractions to push out the placenta.   Follow-up care is a key part of your treatment and safety. Be sure to make and go to all appointments, and call your doctor if you are having problems. It's also a good idea to know your test results and keep a list of the  "medicines you take.  Where can you learn more?  Go to https://www.[employer].net/patiented  Enter B912 in the search box to learn more about \"Weeks 34 to 36 of Your Pregnancy: Care Instructions.\"  Current as of: July 10, 2023               Content Version: 14.0    1434-2433 [employer].   Care instructions adapted under license by your healthcare professional. If you have questions about a medical condition or this instruction, always ask your healthcare professional. [employer] disclaims any warranty or liability for your use of this information.      Group B Strep During Pregnancy: Care Instructions  Overview     Group B strep infection is caused by a type of bacteria. It's a different kind of bacteria than the kind that causes strep throat.  You may have this kind of bacteria in your body. Sometimes it may cause an infection, but most of the time it doesn't make you sick or cause symptoms. But if you pass the bacteria to your baby during the birth, it can cause serious health problems for your baby.  If you have this bacteria in your body, you will get antibiotics when you are in labor. Antibiotics help prevent problems for a  baby.  After birth, doctors will watch and may test your baby. If your baby tests positive for Group B strep, your baby will get antibiotics.  If you plan to breastfeed your baby, don't worry. It will be safe to breastfeed.  Follow-up care is a key part of your treatment and safety. Be sure to make and go to all appointments, and call your doctor if you are having problems. It's also a good idea to know your test results and keep a list of the medicines you take.  How can you care for yourself at home?  If your doctor has prescribed antibiotics, take them as directed. Do not stop taking them just because you feel better. You need to take the full course of antibiotics.  Tell your doctor if you are allergic to any antibiotic.  If you go into labor, or your " "water breaks, go to the hospital. Your doctor will give you antibiotics to help protect your baby from infection.  Tell the doctors and nurses if you have an allergy to penicillin.  Tell the doctors and nurses at the hospital that you tested positive for group B strep.  When should you call for help?   Call your doctor now or seek immediate medical care if:    You have symptoms of a urinary tract infection. These may include:  Pain or burning when you urinate.  A frequent need to urinate without being able to pass much urine.  Pain in the flank, which is just below the rib cage and above the waist on either side of the back.  Blood in your urine.  A fever.     You think you are in labor or your water has broken.     You have pain in your belly or pelvis.   Watch closely for changes in your health, and be sure to contact your doctor if you have any problems.  Where can you learn more?  Go to https://www.[employer]/patiented  Enter M001 in the search box to learn more about \"Group B Strep During Pregnancy: Care Instructions.\"  Current as of: June 12, 2023               Content Version: 14.0    8577-2702 [employer].   Care instructions adapted under license by your healthcare professional. If you have questions about a medical condition or this instruction, always ask your healthcare professional. [employer] disclaims any warranty or liability for your use of this information.      Circumcision in Infants: What to Expect at Home  Your Child's Recovery  After circumcision, your baby's penis may look red and swollen. It may have petroleum jelly and gauze on it. The gauze will likely come off when your baby urinates. Follow your doctor's directions about whether to put clean gauze back on your baby's penis or to leave the gauze off. If you need to remove gauze from the penis, use warm water to soak the gauze and gently loosen it.  The doctor may have used a Plastibell device to do the " circumcision. If so, your baby will have a plastic ring around the head of the penis. The ring should fall off by itself in 10 to 12 days.  A thin, yellow film may form over the area the day after the procedure. This is part of the normal healing process. It should go away in a few days.  Your baby may seem fussy while the area heals. It may hurt for your baby to urinate. This pain often gets better in 3 or 4 days. But it may last for up to 2 weeks.  Even though your baby's penis will likely start to feel better after 3 or 4 days, it may look worse. The penis often starts to look like it's getting better after about 7 to 10 days.  This care sheet gives you a general idea about how long it will take for your child to recover. But each child recovers at a different pace. Follow the steps below to help your child get better as quickly as possible.  How can you care for your child at home?  Activity    Let your baby rest as much as possible. Sleeping will help with recovery.     You can give your baby a sponge bath the day after surgery. Ask your doctor when it is okay to give your baby a bath.   Medicines    Your doctor will tell you if and when your child can restart any medicines. The doctor will also give you instructions about your child taking any new medicines.     Your doctor may recommend giving your baby acetaminophen (Tylenol) to help with pain after the procedure. Be safe with medicines. Give your child medicines exactly as prescribed. Call your doctor if you think your child is having a problem with a medicine.     Do not give your child two or more pain medicines at the same time unless the doctor told you to. Many pain medicines have acetaminophen, which is Tylenol. Too much acetaminophen (Tylenol) can be harmful.   Circumcision care    Always wash your hands before and after touching the circumcision area.     Gently wash your baby's penis with plain, warm water after each diaper change, and pat it dry.  "Do not use soap. Don't use hydrogen peroxide or alcohol. They can slow healing.     Do not try to remove the film that forms on the penis. The film will go away on its own.     Put plenty of petroleum jelly (such as Vaseline) on the circumcision area during each diaper change. This will prevent your baby's penis from sticking to the diaper while it heals.     Fasten your baby's diapers loosely so that there is less pressure on the penis while it heals.   Follow-up care is a key part of your child's treatment and safety. Be sure to make and go to all appointments, and call your doctor if your child is having problems. It's also a good idea to know your child's test results and keep a list of the medicines your child takes.  When should you call for help?   Call your doctor now or seek immediate medical care if:    Your baby has a fever over 100.4 F.     Your baby is extremely fussy or irritable, has a high-pitched cry, or refuses to eat.     Your baby does not have a wet diaper within 12 hours after the circumcision.     You find a spot of bleeding larger than a 2-inch [place] from the incision.     Your baby has signs of infection. Signs may include severe swelling; redness; a red streak on the shaft of the penis; or a thick, yellow discharge.   Watch closely for changes in your child's health, and be sure to contact your doctor if:    A Plastibell device was used for the circumcision and the ring has not fallen off after 10 to 12 days.   Where can you learn more?  Go to https://www.[employer].net/patiented  Enter S255 in the search box to learn more about \"Circumcision in Infants: What to Expect at Home.\"  Current as of: October 24, 2023               Content Version: 14.0    4938-3828 [employer].   Care instructions adapted under license by your healthcare professional. If you have questions about a medical condition or this instruction, always ask your healthcare professional. [employer] " Incorporated disclaims any warranty or liability for your use of this information.

## 2024-05-13 ENCOUNTER — PRENATAL OFFICE VISIT (OUTPATIENT)
Dept: OBGYN | Facility: CLINIC | Age: 29
End: 2024-05-13
Payer: MEDICAID

## 2024-05-13 VITALS
TEMPERATURE: 99.1 F | HEIGHT: 62 IN | DIASTOLIC BLOOD PRESSURE: 70 MMHG | SYSTOLIC BLOOD PRESSURE: 131 MMHG | HEART RATE: 102 BPM | WEIGHT: 209.6 LBS | BODY MASS INDEX: 38.57 KG/M2 | RESPIRATION RATE: 16 BRPM

## 2024-05-13 DIAGNOSIS — Z3A.34 34 WEEKS GESTATION OF PREGNANCY: Primary | ICD-10-CM

## 2024-05-13 NOTE — NURSING NOTE
"Initial /70 (BP Location: Right arm, Patient Position: Chair, Cuff Size: Adult Regular)   Pulse 102   Temp 99.1  F (37.3  C) (Tympanic)   Resp 16   Ht 1.562 m (5' 1.5\")   Wt 95.1 kg (209 lb 9.6 oz)   BMI 38.96 kg/m   Estimated body mass index is 38.96 kg/m  as calculated from the following:    Height as of this encounter: 1.562 m (5' 1.5\").    Weight as of this encounter: 95.1 kg (209 lb 9.6 oz). .    Imelda Haider, ANA    "

## 2024-05-13 NOTE — PROGRESS NOTES
"[facility]    Return OB Note    CC: Return OB     Subjective:  Carol is a 29 year old   at 34w1d   Denies vaginal bleeding, loss of fluid, or regular contractions. Pos fetal movement. No headache, visual disturbance or RUQ pain.  Complaints today: none    Objective:  /70 (BP Location: Right arm, Patient Position: Chair, Cuff Size: Adult Regular)   Pulse 102   Temp 99.1  F (37.3  C) (Tympanic)   Resp 16   Ht 1.562 m (5' 1.5\")   Wt 95.1 kg (209 lb 9.6 oz)   BMI 38.96 kg/m      See flowsheet      Assessment/Plan:       29 year old year old  female with IUP at 34w1d  Encounter Diagnosis   Name Primary?    34 weeks gestation of pregnancy Yes        -NOB labs noted for anemia, chlamydia, hepB NI. Midtrimester labs pos for anemia  -L2 anatomy ultrasound scheduled on   -Tdap declined    -moved here form [place] where she was homeless.  -Anxiety/depression/PTSD: history, prior treatment.   no current meds. Declined need for additional meds or referrals.  -Asthma: well controlled  -Elevated BPs:baseline labs normal.  -Scant prenatal care: third trimester labs discussed and ordered.  -Substance use disorder (meth): UDS 4/15 showed opioids. UDS  neg   -Iron deficiency anemia: need to recheck Hgb next visit.  -Chlamydia in pregnancy, noted on : s/p azithromycin. ELISABET needed after . Test of reinfection due .  -MFM ultrasound scheduled on 24      Return precautions given  Discussed labor, rupture of membranes and preeclampsia precautions.  Discussed fetal movement precautions.    RTC 2 weeks    Lizzy Ballesteros MD   "

## 2024-05-16 ENCOUNTER — PATIENT OUTREACH (OUTPATIENT)
Dept: CARE COORDINATION | Facility: CLINIC | Age: 29
End: 2024-05-16
Payer: MEDICAID

## 2024-05-16 NOTE — PROGRESS NOTES
Clinic Care Coordination Contact    Patient has completed all goals with Clinic Care Coordination. Maintenance is approved.    Leticia Pitt [facility]   Social Work Primary Care Clinic Care Coordinator   [facility]  [phone]  [email or web address]

## 2024-05-16 NOTE — PROGRESS NOTES
Clinic Care Coordination Contact  Community Health Worker Follow Up    Care Gaps:     Health Maintenance Due   Topic Date Due    NICOTINE/TOBACCO CESSATION COUNSELING Q 1 YR  Never done    YEARLY PREVENTIVE VISIT  Never done    ADVANCE CARE PLANNING  Never done    Pneumococcal Vaccine: Pediatrics (0 to 5 Years) and At-Risk Patients (6 to 64 Years) (1 of 2 - PCV) Never done    PAP  01/24/2015    DTAP/TDAP/TD IMMUNIZATION (7 - Td or Tdap) 08/16/2016    COVID-19 Vaccine (1 - 2023-24 season) Never done    MATERNAL SCREENING DISCUSSION  Never done    GROUP B STREP SCREENING  05/26/2024       Postponed to next CHW outreach.     Care Plan:   Care Plan: General       Problem: Baby resources       Goal: Baby resources  Completed 5/16/2024      Start Date: 4/24/2024 Expected End Date: 8/1/2024    This Visit's Progress: 100%    Priority: High    Note:     Barriers: Access, just found out she is pregnant at ~30 weeks  Strengths: Has MA now, motivated   Patient expressed understanding of goal: Yes    Action steps to achieve this goal:  1. I will call local baby resources for supplies. (Completed)    2. I will call WIC to get enrolled. (Completed)  3. I will work with care coordination team as needed.                                Intervention and Education during outreach:   Patient states that they have baby supplies and that they were able to get enrolled with WIC.  Patient states that they have no other questions or concerns for CC at this time and that they are okay with being contacted in two months.    CHW Plan: CHW will route patient to  CC to review for maintenance.    Dhara Brooke CHW, B.A. [facility] Care Coordination  [employer]:   [facility]  [phone]

## 2024-05-21 ENCOUNTER — ANCILLARY PROCEDURE (OUTPATIENT)
Dept: ULTRASOUND IMAGING | Facility: HOSPITAL | Age: 29
End: 2024-05-21
Attending: OBSTETRICS & GYNECOLOGY
Payer: MEDICAID

## 2024-05-21 ENCOUNTER — OFFICE VISIT (OUTPATIENT)
Dept: MATERNAL FETAL MEDICINE | Facility: HOSPITAL | Age: 29
End: 2024-05-21
Attending: OBSTETRICS & GYNECOLOGY
Payer: MEDICAID

## 2024-05-21 DIAGNOSIS — O26.90 PREGNANCY RELATED CONDITION: ICD-10-CM

## 2024-05-21 DIAGNOSIS — O09.33 LATE PRENATAL CARE AFFECTING PREGNANCY IN THIRD TRIMESTER: Primary | ICD-10-CM

## 2024-05-21 DIAGNOSIS — O99.323 DRUG USE AFFECTING PREGNANCY IN THIRD TRIMESTER: ICD-10-CM

## 2024-05-21 PROCEDURE — 76811 OB US DETAILED SNGL FETUS: CPT

## 2024-05-21 PROCEDURE — G0463 HOSPITAL OUTPT CLINIC VISIT: HCPCS | Mod: 25 | Performed by: OBSTETRICS & GYNECOLOGY

## 2024-05-21 PROCEDURE — 76811 OB US DETAILED SNGL FETUS: CPT | Mod: 26 | Performed by: OBSTETRICS & GYNECOLOGY

## 2024-05-21 PROCEDURE — 99203 OFFICE O/P NEW LOW 30 MIN: CPT | Mod: 25 | Performed by: OBSTETRICS & GYNECOLOGY

## 2024-05-21 NOTE — NURSING NOTE
Carol Wen is a  at 35w2d who presents to [facility] for a comprehensive ultrasound. Pt reports positive fetal movement. Pt denies bldg/lof/change in discharge, contractions, headache, vision changes, chest pain/SOB or edema. SBAR given to Dr. Patrick, see note in Epic.       Magnolia France RN

## 2024-05-21 NOTE — PROGRESS NOTES
"Please see \"Imaging\" tab under \"Chart Review\" for details of today's visit.    Gayatri Patrick    "

## 2024-05-28 ENCOUNTER — CARE COORDINATION (OUTPATIENT)
Dept: CASE MANAGEMENT | Facility: CLINIC | Age: 29
End: 2024-05-28

## 2024-05-28 ENCOUNTER — PRENATAL OFFICE VISIT (OUTPATIENT)
Dept: OBGYN | Facility: CLINIC | Age: 29
End: 2024-05-28
Payer: MEDICAID

## 2024-05-28 VITALS
WEIGHT: 219.2 LBS | DIASTOLIC BLOOD PRESSURE: 82 MMHG | SYSTOLIC BLOOD PRESSURE: 126 MMHG | HEART RATE: 111 BPM | TEMPERATURE: 98.6 F | BODY MASS INDEX: 40.34 KG/M2 | RESPIRATION RATE: 16 BRPM | HEIGHT: 62 IN

## 2024-05-28 DIAGNOSIS — Z12.4 CERVICAL CANCER SCREENING: ICD-10-CM

## 2024-05-28 DIAGNOSIS — O98.813 CHLAMYDIA INFECTION AFFECTING PREGNANCY IN THIRD TRIMESTER: ICD-10-CM

## 2024-05-28 DIAGNOSIS — Z87.898 HISTORY OF SUBSTANCE USE DISORDER: ICD-10-CM

## 2024-05-28 DIAGNOSIS — A74.9 CHLAMYDIA INFECTION AFFECTING PREGNANCY IN THIRD TRIMESTER: ICD-10-CM

## 2024-05-28 DIAGNOSIS — Z34.83 PRENATAL CARE, SUBSEQUENT PREGNANCY IN THIRD TRIMESTER: Primary | ICD-10-CM

## 2024-05-28 DIAGNOSIS — Z11.3 SCREENING FOR STD (SEXUALLY TRANSMITTED DISEASE): ICD-10-CM

## 2024-05-28 DIAGNOSIS — D50.9 IRON DEFICIENCY ANEMIA DURING PREGNANCY: ICD-10-CM

## 2024-05-28 DIAGNOSIS — O99.019 IRON DEFICIENCY ANEMIA DURING PREGNANCY: ICD-10-CM

## 2024-05-28 PROCEDURE — G0145 SCR C/V CYTO,THINLAYER,RESCR: HCPCS | Performed by: OBSTETRICS & GYNECOLOGY

## 2024-05-28 PROCEDURE — 87624 HPV HI-RISK TYP POOLED RSLT: CPT | Performed by: OBSTETRICS & GYNECOLOGY

## 2024-05-28 PROCEDURE — 87653 STREP B DNA AMP PROBE: CPT | Performed by: OBSTETRICS & GYNECOLOGY

## 2024-05-28 RX ORDER — AZITHROMYCIN 1 G/1
1 POWDER, FOR SUSPENSION ORAL ONCE
Qty: 1 PACKET | Refills: 0 | Status: SHIPPED | OUTPATIENT
Start: 2024-05-28 | End: 2024-05-28

## 2024-05-28 NOTE — PROGRESS NOTES
Care Management Note:    SW received phone call from Radha [facility] CPS SW, (cell [phone]) informing this writer that pt was actively followed in [place] by CPS with 3 known termination of parental rights cases due to chronic drugs abuse and mental health issues.      Per Radha, [facility] CPS received notification that pt is now in [place] and will need an automatic CPS report filed at time of delivery due to previous termination of parental rights of previous born children.  [facility] also recommending that pt's provider order UDS at future appointments.      [facility] CPS reporting can be done 24/7 via phone at  [phone] or via their website.    SW to route note to clinic care coordination team & to OB/GYN team for notification of immediate [facility] CPS involvement at time delivery.    DEVON Mendiola

## 2024-05-28 NOTE — NURSING NOTE
"Initial /82 (BP Location: Left arm, Patient Position: Chair, Cuff Size: Adult Regular)   Pulse 111   Temp 98.6  F (37  C) (Tympanic)   Resp 16   Ht 1.562 m (5' 1.5\")   Wt 99.4 kg (219 lb 3.2 oz)   BMI 40.75 kg/m   Estimated body mass index is 40.75 kg/m  as calculated from the following:    Height as of this encounter: 1.562 m (5' 1.5\").    Weight as of this encounter: 99.4 kg (219 lb 3.2 oz). .    Imelda Haider, ANA    "

## 2024-05-28 NOTE — PROGRESS NOTES
Care Management Note:    SW received phone call from Radha [facility] CPS SW, (cell [phone]) informing this writer that pt was actively followed in [place] by CPS with 3 known termination of parental rights cases due to chronic drugs abuse and mental health issues.      Per Radha [facility] CPS received notification that pt is now in [place] and will need an automatic CPS report filed at time of delivery due to previous termination of parental rights of previous born children.  Radha also recommending that pt's provider order UDS at future appointments.      [facility] CPS reporting can be done 24/7 via phone at  [phone] or via their website.    SW to route note to clinic care coordination team & to OB/GYN team for notification of immediate [facility] CPS involvement at time delivery.    Inpatient CM team will follow pt at time of delivery at [facility].    DEVON Mendiola                 O-T Plasty Text: The defect edges were debeveled with a #15 scalpel blade.  Given the location of the defect, shape of the defect and the proximity to free margins an O-T plasty was deemed most appropriate.  Using a sterile surgical marker, an appropriate O-T plasty was drawn incorporating the defect and placing the expected incisions within the relaxed skin tension lines where possible.    The area thus outlined was incised deep to adipose tissue with a #15 scalpel blade.  The skin margins were undermined to an appropriate distance in all directions utilizing iris scissors.

## 2024-05-28 NOTE — PROGRESS NOTES
"[facility]    Return OB Note    CC: Return OB     Subjective:  Carol is a 29 year old  at 36w2d   Denies vaginal bleeding, loss of fluid, or regular contractions. Good fetal movement.  Complaints today: None    Objective:  /82 (BP Location: Left arm, Patient Position: Chair, Cuff Size: Adult Regular)   Pulse 111   Temp 98.6  F (37  C) (Tympanic)   Resp 16   Ht 1.562 m (5' 1.5\")   Wt 99.4 kg (219 lb 3.2 oz)   BMI 40.75 kg/m      Fundal height: 36cm  FHT: 120bpm  SVE: declines     Assessment/Plan:   Encounter Diagnoses   Name Primary?    Prenatal care, subsequent pregnancy in third trimester Yes    History of substance use disorder     Chlamydia infection affecting pregnancy in third trimester     Iron deficiency anemia during pregnancy     Cervical cancer screening     Screening for STD (sexually transmitted disease)        IUP at 36w2d  -NOB labs noted for anemia, chlamydia, hepB NI. Midtrimester labs pos for anemia. Pap smear collected today    -L2 anatomy ultrasound: tortuous ductal arch otherwise normal, follow up scheduled for suboptimal views and growth US due to late prenatal dating   -Tdap declined  -GBS collected today     Co-Morbidities/Complications/Concerns:    -moved here form [place] where she was homeless.  -Anxiety/depression/PTSD: history, prior treatment.   no current meds. Declined need for additional meds or referrals.  -Asthma: well controlled  -Elevated BP in ED at 30 weeks: baseline labs normal. BP has been normal since    -Late and limited prenatal care: dating by 30 week US.   -Substance use disorder (meth): UDS 4/15 showed opioids. UDS  neg. Also history of alcohol use in pregnancy.  -Iron deficiency anemia: Hb 9.4g/dL, has been taking prenatal with iron. Declines recheck Hb today. Recommend IV iron, which she also declines.  -Chlamydia in pregnancy, noted on : was prescribed Azithromycin but didn't take as medication got lost in move. " Resent prescription today. Her partner has his medication, they will plan to take at the same time. Will need test of cure around time of delivery. Discussed risk of fetal infection at time of delivery.  -Strict return precautions given    RTC 1 weeks    Areli Lau DO

## 2024-05-28 NOTE — PROGRESS NOTES
Care Management Note:     SW received phone call from Radha [facility] CPS SW, (cell [phone]) informing this writer that pt was actively followed in [place] by CPS with 3 known termination of parental rights cases due to chronic drugs abuse and mental health issues.       Per Radha [facility] CPS received notification that pt is now in [place] and will need an automatic CPS report filed at time of delivery due to previous termination of parental rights of previous born children.  Radha also recommending that pt's provider order UDS at future appointments.       [facility] CPS reporting can be done 24/7 via phone at [phone] or via their website.     SW to route note to clinic care coordination team & to OB/GYN team for notification of immediate [facility] CPS involvement at time delivery.     Inpatient CM team will follow pt at time of delivery at [facility].     DEVON Mendiola

## 2024-05-28 NOTE — PATIENT INSTRUCTIONS
You have been provided the My Labor and Birth Wishes document.  Please review at home and bring to your next prenatal visit. Bring this sheet to the hospital for your birth. Give copies to your care team members and support person.   Additional copies can be found here:  [email or web address]  Counting Your Baby's Kicks: Care Instructions  Overview     Counting your baby's kicks is one way your doctor can tell that your baby is healthy. You will probably feel your baby move for the first time between 16 and 22 weeks. The movement may feel like flutters rather than kicks. Your baby may move more at certain times of the day. When you are active, you may notice less kicking than when you are resting. At your prenatal visits, your doctor will ask whether the baby is active.  In your last trimester, your doctor may ask you to count the number of times you feel your baby move.  Follow-up care is a key part of your treatment and safety. Be sure to make and go to all appointments, and call your doctor if you are having problems. It's also a good idea to know your test results and keep a list of the medicines you take.  How do you count fetal kicks?  A common method of checking your baby's movement is to note the length of time it takes to count 10 movements (such as kicks, flutters, or rolls).  Pick your baby's most active time of day to count. This may be any time from morning to evening.  If you don't feel 10 movements in an hour, have something to eat or drink and count for another hour. If you don't feel at least 10 movements in the 2-hour period, call your doctor.  Do not use an at-home Doppler heart monitor in place of counting fetal movements.  When should you call for help?   Call your doctor now or seek immediate medical care if:    You feel fewer than 10 movements in a 2-hour period.     You noticed that your baby has stopped moving or is moving less than normal.   Watch closely for changes in your health,  "and be sure to contact your doctor if you have any problems.  Where can you learn more?  Go to https://[email or web address]/patiented  Enter U048 in the search box to learn more about \"Counting Your Baby's Kicks: Care Instructions.\"  Current as of: July 10, 2023               Content Version: 14.0    4781-8483 [employer].   Care instructions adapted under license by your healthcare professional. If you have questions about a medical condition or this instruction, always ask your healthcare professional. [employer] disclaims any warranty or liability for your use of this information.        Learning About Birth Control After Childbirth  What is birth control?  Birth control is any method used to prevent pregnancy. If you have vaginal sex without birth control, you could get pregnant--even if you haven't started having periods again. You're less likely to get pregnant while breastfeeding, but it's still possible. Finding birth control that works for you can help avoid an unplanned pregnancy.  There are many kinds of birth control. Each has pros and cons. Find what works for you. Talk to your doctor if you've just given birth or are breastfeeding.    Long-acting reversible contraception (LARC). These are placed inside your body by a doctor. They can prevent pregnancy for years.  Examples include:  An implant (hormonal).  Copper intrauterine device (IUD).  Hormonal IUDs.    Short-acting hormonal methods. These release hormones. Examples include:  Combination birth control pills (\"the pill\").  Skin patches.  A vaginal ring.  A shot.  Mini-pills. Choose progestin-only options soon after giving birth.    Barrier methods. Use these every time you have vaginal sex.  Examples include:  External (male) condoms.  Internal (female) condoms.  Diaphragms.  Cervical caps.  Sponges.    Spermicides. These kill sperm or stop sperm from moving. They can be gels, creams, foams, films, or tablets. Use them " "before vaginal sex.  Examples include:  Nonoxynol-9.  pH regulator gel.    Permanent birth control (sterilization). This can be an option if you're sure that you don't want to get pregnant later.  Examples include:  Vasectomy.  Having tubes tied (tubal ligation).    Emergency contraception. This is a backup method. Use it if you didn't use birth control or your birth control method failed.  Examples include:  Copper and hormonal IUDs.  Emergency contraceptive pills.    Fertility awareness. You'll learn when you are most likely to become pregnant (are fertile). You can avoid vaginal sex at that time.  It's also called:  Natural family planning.  The rhythm method.    Breastfeeding. This is most effective when all of these are true:  Your baby is younger than 6 months old.  You're breastfeeding and not bottle-feeding at all.  You aren't having periods.  Follow-up care is a key part of your treatment and safety. Be sure to make and go to all appointments, and call your doctor if you are having problems. It's also a good idea to know your test results and keep a list of the medicines you take.  Where can you learn more?  Go to https://www.[employer].net/patiented  Enter X408 in the search box to learn more about \"Learning About Birth Control After Childbirth.\"  Current as of: July 10, 2023               Content Version: 14.0    7530-4231 [employer].   Care instructions adapted under license by your healthcare professional. If you have questions about a medical condition or this instruction, always ask your healthcare professional. [employer] disclaims any warranty or liability for your use of this information.      Week 37 of Your Pregnancy: Care Instructions    Most babies are born between 37 and 40 weeks.    This is a good time to pack a bag to take with you to the birth. Then it will be ready to go when you are.    Learn about breastfeeding.  For example, find out about ways to hold your " "baby to make breastfeeding easier. And think about learning how to pump and store milk.     Know that crying is normal.  It's common for babies to cry 1 to 3 hours a day. Some cry more, and some cry less.     Learn why babies cry.  They may be hungry; have gas; need a diaper change; or feel cold, warm, tired, lonely, or tense. Sometimes they cry for unknown reasons.     Think about what will help you stay calm when your baby cries.  Taking slow, deep breaths can help. So can taking a break. It's okay to put your baby somewhere safe (like their crib) and walk away for a few minutes.     Learn about safe sleep for your baby.  Always put your baby to sleep on their back. Place them alone in a crib or bassinet with a firm, flat surface. Keep soft items like stuffed animals out of the crib.     Learn what to expect with  poop.  Your baby will have their own bowel patterns. Some babies have several bowel movements a day. Some have fewer.     Know that  babies will often have loose, yellow bowel movements.  Formula-fed babies have more formed stools. If your baby's poop looks like pellets, your baby is constipated.   Follow-up care is a key part of your treatment and safety. Be sure to make and go to all appointments, and call your doctor if you are having problems. It's also a good idea to know your test results and keep a list of the medicines you take.  Where can you learn more?  Go to https://www.[employer].net/patiented  Enter N257 in the search box to learn more about \"Week 37 of Your Pregnancy: Care Instructions.\"  Current as of: July 10, 2023               Content Version: 14.0    1290-8594 [employer].   Care instructions adapted under license by your healthcare professional. If you have questions about a medical condition or this instruction, always ask your healthcare professional. [employer] disclaims any warranty or liability for your use of this information.      "

## 2024-05-29 ENCOUNTER — PATIENT OUTREACH (OUTPATIENT)
Dept: CARE COORDINATION | Facility: CLINIC | Age: 29
End: 2024-05-29
Payer: MEDICAID

## 2024-05-29 ENCOUNTER — HOSPITAL ENCOUNTER (OUTPATIENT)
Facility: CLINIC | Age: 29
Discharge: HOME | End: 2024-05-29
Attending: OBSTETRICS & GYNECOLOGY | Admitting: OBSTETRICS & GYNECOLOGY
Payer: MEDICAID

## 2024-05-29 VITALS — DIASTOLIC BLOOD PRESSURE: 58 MMHG | RESPIRATION RATE: 16 BRPM | SYSTOLIC BLOOD PRESSURE: 132 MMHG | TEMPERATURE: 98.1 F

## 2024-05-29 DIAGNOSIS — K21.00 GASTROESOPHAGEAL REFLUX DISEASE WITH ESOPHAGITIS WITHOUT HEMORRHAGE: Primary | ICD-10-CM

## 2024-05-29 PROBLEM — Z36.89 ENCOUNTER FOR TRIAGE IN PREGNANT PATIENT: Status: ACTIVE | Noted: 2024-05-29

## 2024-05-29 LAB
ALBUMIN UR-MCNC: NEGATIVE MG/DL
APPEARANCE UR: ABNORMAL
BACTERIA #/AREA URNS HPF: ABNORMAL /HPF
BILIRUB UR QL STRIP: NEGATIVE
CAOX CRY #/AREA URNS HPF: ABNORMAL /HPF
COLOR UR AUTO: YELLOW
GLUCOSE UR STRIP-MCNC: NEGATIVE MG/DL
GP B STREP DNA SPEC QL NAA+PROBE: NEGATIVE
HGB UR QL STRIP: NEGATIVE
HPV HR 12 DNA CVX QL NAA+PROBE: NEGATIVE
HPV16 DNA CVX QL NAA+PROBE: NEGATIVE
HPV18 DNA CVX QL NAA+PROBE: NEGATIVE
HUMAN PAPILLOMA VIRUS FINAL DIAGNOSIS: NORMAL
KETONES UR STRIP-MCNC: NEGATIVE MG/DL
LEUKOCYTE ESTERASE UR QL STRIP: ABNORMAL
NITRATE UR QL: NEGATIVE
PH UR STRIP: 6 [PH] (ref 5–7)
RBC URINE: 2 /HPF
SP GR UR STRIP: 1.01 (ref 1–1.03)
SQUAMOUS EPITHELIAL: 12 /HPF
UROBILINOGEN UR STRIP-MCNC: NORMAL MG/DL
WBC URINE: 18 /HPF

## 2024-05-29 PROCEDURE — 59025 FETAL NON-STRESS TEST: CPT

## 2024-05-29 PROCEDURE — G0463 HOSPITAL OUTPT CLINIC VISIT: HCPCS | Mod: 25

## 2024-05-29 PROCEDURE — 87086 URINE CULTURE/COLONY COUNT: CPT | Performed by: OBSTETRICS & GYNECOLOGY

## 2024-05-29 PROCEDURE — 81001 URINALYSIS AUTO W/SCOPE: CPT | Performed by: OBSTETRICS & GYNECOLOGY

## 2024-05-29 RX ORDER — LIDOCAINE 40 MG/G
CREAM TOPICAL
Status: DISCONTINUED | OUTPATIENT
Start: 2024-05-29 | End: 2024-05-30 | Stop reason: HOSPADM

## 2024-05-29 RX ORDER — OMEPRAZOLE 40 MG/1
40 CAPSULE, DELAYED RELEASE ORAL
Qty: 30 CAPSULE | Refills: 3 | Status: ON HOLD | OUTPATIENT
Start: 2024-05-29 | End: 2024-06-18

## 2024-05-29 ASSESSMENT — ACTIVITIES OF DAILY LIVING (ADL): ADLS_ACUITY_SCORE: 20

## 2024-05-30 ENCOUNTER — PATIENT OUTREACH (OUTPATIENT)
Dept: CARE COORDINATION | Facility: CLINIC | Age: 29
End: 2024-05-30
Payer: MEDICAID

## 2024-05-30 NOTE — DISCHARGE INSTRUCTIONS
Learning About When to Call Your Doctor During Pregnancy (After 20 Weeks)  Overview  It's common to have concerns about what might be a problem when you're pregnant. Most pregnancies don't have any serious problems. But it's still important to know when to call your doctor if you have certain symptoms or signs of labor.  These are general suggestions. Your doctor may give you some more information about when to call.  When to call your doctor (after 20 weeks)  Call 911  anytime you think you may need emergency care. For example, call if:  You have severe vaginal bleeding. This means you are soaking through a pad each hour for 2 or more hours.  You have sudden, severe pain in your belly.  You have chest pain, are short of breath, or cough up blood.  You passed out (lost consciousness).  You have a seizure.  You see or feel the umbilical cord.  You think you are about to deliver your baby and can't make it safely to the hospital or birthing center.  Call your doctor now or seek immediate medical care if:  You have vaginal bleeding.  You have belly pain.  You have a fever.  You are dizzy or lightheaded, or you feel like you may faint.  You have signs of a blood clot in your leg (called a deep vein thrombosis), such as:  Pain in the calf, back of the knee, thigh, or groin.  Swelling in your leg or groin.  A color change on the leg or groin. The skin may be reddish or purplish, depending on your usual skin color.  You have symptoms of preeclampsia, such as:  Sudden swelling of your face, hands, or feet.  New vision problems (such as dimness, blurring, or seeing spots).  A severe headache.  You have a sudden release of fluid from your vagina. (You think your water broke.)  You've been having regular contractions for an hour. This means that you've had at least 6 contractions within 1 hour, even after you change your position and drink fluids.  You notice that your baby has stopped moving or is moving less than  "normal.  You have signs of heart failure, such as:  New or increased shortness of breath.  New or worse swelling in your legs, ankles, or feet.  Sudden weight gain, such as more than 2 to 3 pounds in a day or 5 pounds in a week.  Feeling so tired or weak that you cannot do your usual activities.  You have symptoms of a urinary tract infection. These may include:  Pain or burning when you urinate.  A frequent need to urinate without being able to pass much urine.  Pain in the flank, which is just below the rib cage and above the waist on either side of the back.  Blood in your urine.  Watch closely for changes in your health, and be sure to contact your doctor if:  You have vaginal discharge that smells bad.  You feel sad, anxious, or hopeless for more than a few days.  You have skin changes, such as a rash, itching, or a yellow color to your skin.  You have other concerns about your pregnancy.  If you have labor signs at 37 weeks or more  If you have signs of labor at 37 weeks or more, your doctor may tell you to call when your labor becomes more active. Symptoms of active labor include:  Contractions that are regular.  Contractions that are less than 5 minutes apart.  Contractions that are hard to talk through.  Follow-up care is a key part of your treatment and safety. Be sure to make and go to all appointments, and call your doctor if you are having problems. It's also a good idea to know your test results and keep a list of the medicines you take.  Where can you learn more?  Go to https://www.[employer]Tree.net/patiented  Enter N531 in the search box to learn more about \"Learning About When to Call Your Doctor During Pregnancy (After 20 Weeks).\"  Current as of: July 10, 2023               Content Version: 14.0    0933-5634 [employer].   Care instructions adapted under license by your healthcare professional. If you have questions about a medical condition or this instruction, always ask your healthcare " professional. [employer] disclaims any warranty or liability for your use of this information.

## 2024-05-30 NOTE — PROGRESS NOTES
Clinic Care Coordination Contact    Situation: Patient chart reviewed by care coordinator.    Background: Pt presented to [facility] ED 5/29/24 for evaluation of GERD, triage in pregnant patient.     Assessment: No notes available. Discharged home.     Plan/Recommendations: CHW and SW CC to outreach as previously indicated.     DEVON Fay   Social Work Primary Care Clinic Care Coordinator   [facility]  [phone]  [email or web address]

## 2024-05-30 NOTE — PLAN OF CARE
S: Discharge from triage  A: A:moderate variablility, + accels, no decels, Category I  irritability  Strip reviewed by Lucy Son RN and Rosmery Gresham RN.  not examined  Admission on 05/29/2024   Component Date Value    Color Urine 05/29/2024 Yellow     Appearance Urine 05/29/2024 Slightly Cloudy (A)     Glucose Urine 05/29/2024 Negative     Bilirubin Urine 05/29/2024 Negative     Ketones Urine 05/29/2024 Negative     Specific Gravity Urine 05/29/2024 1.011     Blood Urine 05/29/2024 Negative     pH Urine 05/29/2024 6.0     Protein Albumin Urine 05/29/2024 Negative     Urobilinogen Urine 05/29/2024 Normal     Nitrite Urine 05/29/2024 Negative     Leukocyte Esterase Urine 05/29/2024 Moderate (A)     Bacteria Urine 05/29/2024 Few (A)     Calcium Oxalate Crystals* 05/29/2024 Few (A)     RBC Urine 05/29/2024 2     WBC Urine 05/29/2024 18 (H)     Squamous Epithelials Uri* 05/29/2024 12 (H)      Dr. PHAN Lau informed of above and discharge order received.   R: Plan includes: Fetal kick count instructions given. Lab results pending: urine culture. Patient instructed to report any recurrence of above concerns to her primary care provider during clinic hours or [facility] at any other time. Patient verbalized understanding of After Visit Summary, education and agreement with plan. Agrees to call for any problems, questions or concerns.  Discharged undelivered via ambulatory  in stable condition with all belongings. Accompanied by significant other and mother.

## 2024-05-30 NOTE — PLAN OF CARE
S:Patient presents due to  backache.  B:36w3d   Allergies: Amoxicillin, Penicillins, Sulfa antibiotics, and Sulfamethoxazole-trimethoprim  A:A:moderate variablility, + accels, no decels, Category I  irritability      Dr. PHAN Lau in department and orders received.  Plan includes; Encourage po fluids and Urine culture, NST. Reviewed with patient and she agrees with plan.   Bill of Rights given & questions discussed?: Yes  HC Your Rights handout : Informed and given    Prenatal Breastfeeding Education Toolkit provided for patient to review,helping her to make an informed decision on a feeding choice for her baby. Patient accepted. Questions answered.    Oriented to room and call light.

## 2024-05-31 LAB — BACTERIA UR CULT: NORMAL

## 2024-06-01 LAB
BKR LAB AP GYN ADEQUACY: NORMAL
BKR LAB AP GYN INTERPRETATION: NORMAL
BKR LAB AP PREVIOUS ABNORMAL: NORMAL
PATH REPORT.COMMENTS IMP SPEC: NORMAL
PATH REPORT.COMMENTS IMP SPEC: NORMAL
PATH REPORT.RELEVANT HX SPEC: NORMAL

## 2024-06-03 NOTE — PATIENT INSTRUCTIONS
Week 38 of Your Pregnancy: Care Instructions  Believe it or not, your baby is almost here. You may notice how your baby responds to sounds, warmth, cold, and light. You may even know what kind of music your baby likes.    Even if you expect a vaginal birth, it's a good idea to learn about  section ().  is the delivery of a baby through a cut (incision) in your belly. It's a major surgery, so it has more risks than a vaginal birth.    During the first 2 weeks after the birth, limit visitors. Don't allow visitors who have colds or infections. Ask visitors to wash their hands before they touch your baby. And never let anyone smoke around your baby.    Know about unplanned C-sections.  Reasons for an unplanned  include labor that slows or stops, signs of distress in your baby, and high blood pressure or other problems for you.     Know about planned C-sections.  If your baby isn't in a head-down position for delivery (breech position), your doctor may plan a . Or you may have a planned  if you're having twins or more.     Get as much help as you can while you're in the hospital.  You can learn about feeding, diapering, and bathing your baby.     Talk to your doctor or midwife about how to care for the umbilical cord stump.  If your baby will be circumcised, also ask about how to care for that.     Ask friends or family for help, as you need it.  If you can, have another adult in your home for at least 2 or 3 days after the birth.     Try to nap when your baby naps.  This may be your best chance to get some sleep.     Watch for changes in your mental health.  For the first 1 to 2 weeks after birth, it's common to cry or feel sad or irritable. If these feelings last for more than 2 weeks, you may have postpartum depression. Ask your doctor for help. Postpartum depression can be treated.   Follow-up care is a key part of your treatment and safety. Be sure to make and go  "to all appointments, and call your doctor if you are having problems. It's also a good idea to know your test results and keep a list of the medicines you take.  Where can you learn more?  Go to https://www.[employer].net/patiented  Enter B044 in the search box to learn more about \"Week 38 of Your Pregnancy: Care Instructions.\"  Current as of: July 10, 2023               Content Version: 14.0    6042-0253 [employer].   Care instructions adapted under license by your healthcare professional. If you have questions about a medical condition or this instruction, always ask your healthcare professional. [employer] disclaims any warranty or liability for your use of this information.      "

## 2024-06-04 ENCOUNTER — PRENATAL OFFICE VISIT (OUTPATIENT)
Dept: OBGYN | Facility: CLINIC | Age: 29
End: 2024-06-04
Payer: MEDICAID

## 2024-06-04 VITALS
WEIGHT: 222 LBS | RESPIRATION RATE: 18 BRPM | SYSTOLIC BLOOD PRESSURE: 100 MMHG | HEIGHT: 62 IN | HEART RATE: 98 BPM | TEMPERATURE: 99 F | BODY MASS INDEX: 40.85 KG/M2 | DIASTOLIC BLOOD PRESSURE: 67 MMHG

## 2024-06-04 DIAGNOSIS — A74.9 CHLAMYDIA INFECTION AFFECTING PREGNANCY IN THIRD TRIMESTER: ICD-10-CM

## 2024-06-04 DIAGNOSIS — O98.813 CHLAMYDIA INFECTION AFFECTING PREGNANCY IN THIRD TRIMESTER: ICD-10-CM

## 2024-06-04 DIAGNOSIS — O26.843 SIZE OF FETUS INCONSISTENT WITH DATES IN THIRD TRIMESTER: ICD-10-CM

## 2024-06-04 DIAGNOSIS — Z87.898 HISTORY OF SUBSTANCE USE DISORDER: ICD-10-CM

## 2024-06-04 DIAGNOSIS — Z34.83 PRENATAL CARE, SUBSEQUENT PREGNANCY IN THIRD TRIMESTER: Primary | ICD-10-CM

## 2024-06-04 DIAGNOSIS — O99.019 IRON DEFICIENCY ANEMIA DURING PREGNANCY: ICD-10-CM

## 2024-06-04 DIAGNOSIS — D50.9 IRON DEFICIENCY ANEMIA DURING PREGNANCY: ICD-10-CM

## 2024-06-04 PROCEDURE — 99207 PR PRENATAL VISIT: CPT | Performed by: PHYSICIAN ASSISTANT

## 2024-06-04 NOTE — NURSING NOTE
"Initial /67 (BP Location: Right arm, Patient Position: Chair, Cuff Size: Adult Large)   Pulse 98   Temp 99  F (37.2  C) (Tympanic)   Resp 18   Ht 1.562 m (5' 1.5\")   Wt 100.7 kg (222 lb)   BMI 41.27 kg/m   Estimated body mass index is 41.27 kg/m  as calculated from the following:    Height as of this encounter: 1.562 m (5' 1.5\").    Weight as of this encounter: 100.7 kg (222 lb). .    "

## 2024-06-04 NOTE — PROGRESS NOTES
"[facility]     Return OB Note     CC: Return OB      Subjective:  Carol is a 29 year old  at 37w2d   Denies vaginal bleeding, loss of fluid, or regular contractions. Good fetal movement.    Complaints today: None     Objective:  /67 (BP Location: Right arm, Patient Position: Chair, Cuff Size: Adult Large)   Pulse 98   Temp 99  F (37.2  C) (Tympanic)   Resp 18   Ht 1.562 m (5' 1.5\")   Wt 100.7 kg (222 lb)   BMI 41.27 kg/m         Fundal height: 40 cm  FHT: 125 bpm   SVE: declines      Assessment/Plan:       ICD-10-CM    1. Prenatal care, subsequent pregnancy in third trimester  Z34.83       2. History of substance use disorder  Z87.898       3. Chlamydia infection affecting pregnancy in third trimester  O98.813     A74.9       4. Iron deficiency anemia during pregnancy  O99.019     D50.9       5. Size of fetus inconsistent with dates in third trimester  O26.843           IUP at 36w2d  -NOB labs noted for anemia, chlamydia, hepB NI. Midtrimester labs pos for anemia. Pap smear collected 24 NILM HPV neg  -L2 anatomy ultrasound: tortuous ductal arch otherwise normal, follow up scheduled for suboptimal views and growth US due to late prenatal dating with MFM on 24  -Tdap declined  -GBS neg     Co-Morbidities/Complications/Concerns:    -moved here form [place] where she was homeless.  -Anxiety/depression/PTSD: history, prior treatment.   no current meds. Declined need for additional meds or referrals.  -Asthma: well controlled  -Elevated BP in ED at 30 weeks: baseline labs normal. BP has been normal since    -Late and limited prenatal care: dating by 30 week US.   -Substance use disorder (meth): UDS 4/15 showed opioids. UDS  neg. Also history of alcohol use in pregnancy.  -Iron deficiency anemia: Hb 9.4g/dL, has been taking prenatal with iron. Declines recheck Hb.   Recommend IV iron, which she also declines.  -Chlamydia in pregnancy, noted on : was prescribed " Azithromycin but didn't take as medication got lost in move. Prescription resent at last visit and she states she has taken it. Her partner has his medication, they will plan to take at the same time. Will need test of cure around time of delivery. Discussed risk of fetal infection at time of delivery.  -S>D today patient has growth US scheduled with MFM for next week.     -Strict return precautions given     RTC 1 week  Cassandra Clement PA-C

## 2024-06-09 ENCOUNTER — HOSPITAL ENCOUNTER (OUTPATIENT)
Facility: CLINIC | Age: 29
Discharge: HOME | End: 2024-06-09
Attending: OBSTETRICS & GYNECOLOGY | Admitting: OBSTETRICS & GYNECOLOGY
Payer: COMMERCIAL

## 2024-06-09 VITALS
DIASTOLIC BLOOD PRESSURE: 65 MMHG | HEART RATE: 99 BPM | TEMPERATURE: 98.9 F | RESPIRATION RATE: 16 BRPM | SYSTOLIC BLOOD PRESSURE: 129 MMHG

## 2024-06-09 PROCEDURE — G0463 HOSPITAL OUTPT CLINIC VISIT: HCPCS | Mod: 25

## 2024-06-09 PROCEDURE — 59025 FETAL NON-STRESS TEST: CPT | Mod: 59

## 2024-06-09 PROCEDURE — 999N000105 HC STATISTIC NO DOCUMENTATION TO SUPPORT CHARGE

## 2024-06-09 RX ORDER — LIDOCAINE 40 MG/G
CREAM TOPICAL
Status: DISCONTINUED | OUTPATIENT
Start: 2024-06-09 | End: 2024-06-09 | Stop reason: HOSPADM

## 2024-06-09 ASSESSMENT — ACTIVITIES OF DAILY LIVING (ADL)
ADLS_ACUITY_SCORE: 22
ADLS_ACUITY_SCORE: 22
ADLS_ACUITY_SCORE: 35

## 2024-06-09 NOTE — PROGRESS NOTES
Multip 38 weeks complain of decreased fetal movement for 24 hrs. Baseline heart rate 130 with accels to 150, reactive NST. Category 1 monitor strip. Patient has drug history this pregnancy, open about history and states no longer using and plans not to. Will wait on urine drug screen until she comes in for labor. May discharge to home. Discharge instructions reviewed states understanding.

## 2024-06-09 NOTE — DISCHARGE INSTRUCTIONS
Discharge Instructions for Undelivered Patients  Birthplace [phone]    Diet:  Drink 8 to 12 glasses of water every day.  You may eat meals and snacks as before    Activity:  If you are experiencing  labor, rest the pelvic area. No sex. Do not stimulate breasts or nipples.  Rest often as needed.  Count fetal kicks every day. (See handout.)  Call your doctor if your baby is moving less than usual.    Medicines:  My care team has reviewed my medicines with me.  My care team has given me a list of my medicines.  My care team has prescribed a new medicine. They have either sent it home with me or ordered it from the pharmacy.    Call your provider if you notice:  Swelling in your face or increased swelling in your hands or legs.  Headaches that are not relieved by Tylenol (acetaminophen).  Changes in your vision (blurring; seeing spots or stars).  Nausea (sick to your stomach) and vomiting (throwing up).  Weight gain of 5 pounds per week.  Heartburn that doesn't go away.  Signs of bladder infection: pain when you urinate (use the toilet), needing to go more often or more urgently.  The bag of shabazz (membranes) breaks, or you notice leaking in your underwear.  Bright red blood in your underwear.  Abdominal (lower belly) or stomach pain.  For first baby: Contractions (tightenings) less than 5 minutes apart for one hour or more.  For Second (plus) baby: Contractions (tightenings) less than 10 minutes apart and getting stronger.  Increase or change in vaginal discharge (note the color and amount).    Follow up with your provider as scheduled.

## 2024-06-10 ENCOUNTER — PATIENT OUTREACH (OUTPATIENT)
Dept: CARE COORDINATION | Facility: CLINIC | Age: 29
End: 2024-06-10
Payer: COMMERCIAL

## 2024-06-10 NOTE — PROGRESS NOTES
Clinic Care Coordination Contact    Situation: Patient chart reviewed by care coordinator.    Background: Pt presented to [facility] 6/9/24 for evaluation of decreased fetal movement for 245 hrs.     Assessment: Workup completed. Discharged home.     Plan/Recommendations: CHW and SW CC to outreach as previously indicated. Pt has future OB appts scheduled.     DEVON Fay   Social Work [facility] Clinic Care Coordinator   [facility]  [phone]  [email or web address]

## 2024-06-11 ENCOUNTER — OFFICE VISIT (OUTPATIENT)
Dept: MATERNAL FETAL MEDICINE | Facility: HOSPITAL | Age: 29
End: 2024-06-11
Attending: OBSTETRICS & GYNECOLOGY
Payer: COMMERCIAL

## 2024-06-11 ENCOUNTER — ANCILLARY PROCEDURE (OUTPATIENT)
Dept: ULTRASOUND IMAGING | Facility: HOSPITAL | Age: 29
End: 2024-06-11
Attending: OBSTETRICS & GYNECOLOGY
Payer: COMMERCIAL

## 2024-06-11 ENCOUNTER — PRENATAL OFFICE VISIT (OUTPATIENT)
Dept: OBGYN | Facility: CLINIC | Age: 29
End: 2024-06-11
Payer: COMMERCIAL

## 2024-06-11 VITALS
TEMPERATURE: 98.2 F | RESPIRATION RATE: 18 BRPM | BODY MASS INDEX: 41.96 KG/M2 | DIASTOLIC BLOOD PRESSURE: 75 MMHG | HEART RATE: 94 BPM | WEIGHT: 228 LBS | HEIGHT: 62 IN | SYSTOLIC BLOOD PRESSURE: 117 MMHG

## 2024-06-11 DIAGNOSIS — O09.33 LATE PRENATAL CARE AFFECTING PREGNANCY IN THIRD TRIMESTER: ICD-10-CM

## 2024-06-11 DIAGNOSIS — O98.813 CHLAMYDIA INFECTION AFFECTING PREGNANCY IN THIRD TRIMESTER: ICD-10-CM

## 2024-06-11 DIAGNOSIS — K64.4 EXTERNAL HEMORRHOIDS: Primary | ICD-10-CM

## 2024-06-11 DIAGNOSIS — Z34.83 PRENATAL CARE, SUBSEQUENT PREGNANCY IN THIRD TRIMESTER: ICD-10-CM

## 2024-06-11 DIAGNOSIS — O09.33 LATE PRENATAL CARE AFFECTING PREGNANCY IN THIRD TRIMESTER: Primary | ICD-10-CM

## 2024-06-11 DIAGNOSIS — A74.9 CHLAMYDIA INFECTION AFFECTING PREGNANCY IN THIRD TRIMESTER: ICD-10-CM

## 2024-06-11 LAB
C TRACH DNA SPEC QL PROBE+SIG AMP: NEGATIVE
N GONORRHOEA DNA SPEC QL NAA+PROBE: NEGATIVE

## 2024-06-11 PROCEDURE — 87491 CHLMYD TRACH DNA AMP PROBE: CPT | Performed by: OBSTETRICS & GYNECOLOGY

## 2024-06-11 PROCEDURE — 87591 N.GONORRHOEAE DNA AMP PROB: CPT | Performed by: OBSTETRICS & GYNECOLOGY

## 2024-06-11 PROCEDURE — 99207 PR COMPLICATED OB VISIT: CPT | Performed by: OBSTETRICS & GYNECOLOGY

## 2024-06-11 PROCEDURE — 99213 OFFICE O/P EST LOW 20 MIN: CPT | Mod: 25 | Performed by: OBSTETRICS & GYNECOLOGY

## 2024-06-11 PROCEDURE — 76819 FETAL BIOPHYS PROFIL W/O NST: CPT

## 2024-06-11 PROCEDURE — 76816 OB US FOLLOW-UP PER FETUS: CPT | Mod: 26 | Performed by: OBSTETRICS & GYNECOLOGY

## 2024-06-11 PROCEDURE — 76819 FETAL BIOPHYS PROFIL W/O NST: CPT | Mod: 26 | Performed by: OBSTETRICS & GYNECOLOGY

## 2024-06-11 RX ORDER — LOPERAMIDE HCL 2 MG
2 CAPSULE ORAL
Status: CANCELLED | OUTPATIENT
Start: 2024-06-11

## 2024-06-11 RX ORDER — CITRIC ACID/SODIUM CITRATE 334-500MG
30 SOLUTION, ORAL ORAL
Status: CANCELLED | OUTPATIENT
Start: 2024-06-11

## 2024-06-11 RX ORDER — OXYTOCIN 10 [USP'U]/ML
10 INJECTION, SOLUTION INTRAMUSCULAR; INTRAVENOUS
Status: CANCELLED | OUTPATIENT
Start: 2024-06-11

## 2024-06-11 RX ORDER — NALOXONE HYDROCHLORIDE 0.4 MG/ML
0.2 INJECTION, SOLUTION INTRAMUSCULAR; INTRAVENOUS; SUBCUTANEOUS
Status: CANCELLED | OUTPATIENT
Start: 2024-06-11

## 2024-06-11 RX ORDER — HYDROCORTISONE 25 MG/G
CREAM TOPICAL 2 TIMES DAILY PRN
Qty: 30 G | Refills: 0 | Status: ON HOLD | OUTPATIENT
Start: 2024-06-11 | End: 2024-06-19

## 2024-06-11 RX ORDER — SODIUM CHLORIDE, SODIUM LACTATE, POTASSIUM CHLORIDE, CALCIUM CHLORIDE 600; 310; 30; 20 MG/100ML; MG/100ML; MG/100ML; MG/100ML
INJECTION, SOLUTION INTRAVENOUS CONTINUOUS
Status: CANCELLED | OUTPATIENT
Start: 2024-06-11

## 2024-06-11 RX ORDER — CITRIC ACID/SODIUM CITRATE 334-500MG
30 SOLUTION, ORAL ORAL ONCE
Status: CANCELLED | OUTPATIENT
Start: 2024-06-11 | End: 2024-06-11

## 2024-06-11 RX ORDER — METOCLOPRAMIDE 10 MG/1
10 TABLET ORAL EVERY 6 HOURS PRN
Status: CANCELLED | OUTPATIENT
Start: 2024-06-11

## 2024-06-11 RX ORDER — LIDOCAINE 40 MG/G
CREAM TOPICAL
Status: CANCELLED | OUTPATIENT
Start: 2024-06-11

## 2024-06-11 RX ORDER — OXYTOCIN/0.9 % SODIUM CHLORIDE 30/500 ML
1-24 PLASTIC BAG, INJECTION (ML) INTRAVENOUS CONTINUOUS
Status: CANCELLED | OUTPATIENT
Start: 2024-06-11

## 2024-06-11 RX ORDER — METHYLERGONOVINE MALEATE 0.2 MG/ML
200 INJECTION INTRAVENOUS
Status: CANCELLED | OUTPATIENT
Start: 2024-06-11

## 2024-06-11 RX ORDER — OXYTOCIN/0.9 % SODIUM CHLORIDE 30/500 ML
100-340 PLASTIC BAG, INJECTION (ML) INTRAVENOUS CONTINUOUS PRN
Status: CANCELLED | OUTPATIENT
Start: 2024-06-11

## 2024-06-11 RX ORDER — PROCHLORPERAZINE 25 MG
25 SUPPOSITORY, RECTAL RECTAL EVERY 12 HOURS PRN
Status: CANCELLED | OUTPATIENT
Start: 2024-06-11

## 2024-06-11 RX ORDER — NALOXONE HYDROCHLORIDE 0.4 MG/ML
0.4 INJECTION, SOLUTION INTRAMUSCULAR; INTRAVENOUS; SUBCUTANEOUS
Status: CANCELLED | OUTPATIENT
Start: 2024-06-11

## 2024-06-11 RX ORDER — OXYTOCIN/0.9 % SODIUM CHLORIDE 30/500 ML
340 PLASTIC BAG, INJECTION (ML) INTRAVENOUS CONTINUOUS PRN
Status: CANCELLED | OUTPATIENT
Start: 2024-06-11

## 2024-06-11 RX ORDER — ONDANSETRON 4 MG/1
4 TABLET, ORALLY DISINTEGRATING ORAL EVERY 6 HOURS PRN
Status: CANCELLED | OUTPATIENT
Start: 2024-06-11

## 2024-06-11 RX ORDER — MISOPROSTOL 200 UG/1
800 TABLET ORAL
Status: CANCELLED | OUTPATIENT
Start: 2024-06-11

## 2024-06-11 RX ORDER — KETOROLAC TROMETHAMINE 30 MG/ML
30 INJECTION, SOLUTION INTRAMUSCULAR; INTRAVENOUS
Status: CANCELLED | OUTPATIENT
Start: 2024-06-11

## 2024-06-11 RX ORDER — PROCHLORPERAZINE MALEATE 5 MG
10 TABLET ORAL EVERY 6 HOURS PRN
Status: CANCELLED | OUTPATIENT
Start: 2024-06-11

## 2024-06-11 RX ORDER — ONDANSETRON 2 MG/ML
4 INJECTION INTRAMUSCULAR; INTRAVENOUS EVERY 6 HOURS PRN
Status: CANCELLED | OUTPATIENT
Start: 2024-06-11

## 2024-06-11 RX ORDER — CARBOPROST TROMETHAMINE 250 UG/ML
250 INJECTION, SOLUTION INTRAMUSCULAR
Status: CANCELLED | OUTPATIENT
Start: 2024-06-11

## 2024-06-11 RX ORDER — TERBUTALINE SULFATE 1 MG/ML
0.25 INJECTION, SOLUTION SUBCUTANEOUS
Status: CANCELLED | OUTPATIENT
Start: 2024-06-11

## 2024-06-11 RX ORDER — MISOPROSTOL 200 UG/1
400 TABLET ORAL
Status: CANCELLED | OUTPATIENT
Start: 2024-06-11

## 2024-06-11 RX ORDER — LOPERAMIDE HCL 2 MG
4 CAPSULE ORAL
Status: CANCELLED | OUTPATIENT
Start: 2024-06-11

## 2024-06-11 RX ORDER — METOCLOPRAMIDE HYDROCHLORIDE 5 MG/ML
10 INJECTION INTRAMUSCULAR; INTRAVENOUS EVERY 6 HOURS PRN
Status: CANCELLED | OUTPATIENT
Start: 2024-06-11

## 2024-06-11 RX ORDER — IBUPROFEN 800 MG/1
800 TABLET, FILM COATED ORAL
Status: CANCELLED | OUTPATIENT
Start: 2024-06-11

## 2024-06-11 RX ORDER — SODIUM CHLORIDE, SODIUM LACTATE, POTASSIUM CHLORIDE, CALCIUM CHLORIDE 600; 310; 30; 20 MG/100ML; MG/100ML; MG/100ML; MG/100ML
INJECTION, SOLUTION INTRAVENOUS CONTINUOUS PRN
Status: CANCELLED | OUTPATIENT
Start: 2024-06-11

## 2024-06-11 RX ORDER — TRANEXAMIC ACID 10 MG/ML
1 INJECTION, SOLUTION INTRAVENOUS EVERY 30 MIN PRN
Status: CANCELLED | OUTPATIENT
Start: 2024-06-11

## 2024-06-11 NOTE — PROGRESS NOTES
"[facility]     Return OB Note     CC: Return OB      Subjective:  Carol is a 29 year old  at 38w2d   Denies vaginal bleeding, loss of fluid, or regular contractions. Good fetal movement.         Objective:  /75 (BP Location: Right arm, Patient Position: Chair, Cuff Size: Adult Regular)   Pulse 94   Temp 98.2  F (36.8  C) (Tympanic)   Resp 18   Ht 1.562 m (5' 1.5\")   Wt 103.4 kg (228 lb)   BMI 42.38 kg/m        FH/Doptones deferred as just came from US and were normal    Assessment/Plan:    IUP at 38w2d   -NOB labs noted for anemia, chlamydia, hepB NI. Midtrimester labs pos for anemia. Pap smear collected 24 NILM HPV neg  -L2 anatomy ultrasound: tortuous ductal arch otherwise normal, follow up scheduled for suboptimal views and growth US due to late prenatal dating with MFM on 24  -Tdap declined  -GBS neg     Co-Morbidities/Complications/Concerns:    -moved here form [place] where she was homeless.  -Anxiety/depression/PTSD: history, prior treatment.   no current meds. Declined need for additional meds or referrals.  -Asthma: well controlled  -Elevated BP in ED at 30 weeks: baseline labs normal. BP has been normal since    -Late and limited prenatal care: dating by 30 week US.   -Substance use disorder (meth): UDS 4/15 showed opioids. UDS  neg. Also history of alcohol use in pregnancy.  -Iron deficiency anemia: Hb 9.4g/dL, has been taking prenatal with iron. Declines recheck Hb.   Recommend IV iron, which she also declines.  -Chlamydia in pregnancy, noted on : was prescribed Azithromycin but didn't take as medication got lost in move. Prescription resent at last visit and she states she has taken it. Her partner has his medication, they will plan to take at the same time. Will need test of cure around time of delivery-- urine pending today. Discussed risk of fetal infection at time of delivery.  - MFM/comp pending today, S>D     -Strict return " precautions given     IOL scheduled for 39w    Dorinda Ramirez MD   6/11/2024 11:15 AM

## 2024-06-11 NOTE — NURSING NOTE
"Initial /75 (BP Location: Right arm, Patient Position: Chair, Cuff Size: Adult Regular)   Pulse 94   Temp 98.2  F (36.8  C) (Tympanic)   Resp 18   Ht 1.562 m (5' 1.5\")   Wt 103.4 kg (228 lb)   BMI 42.38 kg/m   Estimated body mass index is 42.38 kg/m  as calculated from the following:    Height as of this encounter: 1.562 m (5' 1.5\").    Weight as of this encounter: 103.4 kg (228 lb). .    "

## 2024-06-11 NOTE — NURSING NOTE
Carol Wen is a  at 38w2d who presents to [facility] for follow up growth ultrasound. Pt reports positive fetal movement. Pt denies bldg/lof/change in discharge, contractions, headache, vision changes, chest pain/SOB or edema. SBAR given to Dr. Patrick, see note in Epic.

## 2024-06-15 ENCOUNTER — TELEPHONE (OUTPATIENT)
Dept: OBGYN | Facility: CLINIC | Age: 29
End: 2024-06-15
Payer: COMMERCIAL

## 2024-06-16 ENCOUNTER — HOSPITAL ENCOUNTER (INPATIENT)
Facility: CLINIC | Age: 29
LOS: 3 days | Discharge: HOME | End: 2024-06-19
Attending: OBSTETRICS & GYNECOLOGY | Admitting: OBSTETRICS & GYNECOLOGY
Payer: COMMERCIAL

## 2024-06-16 DIAGNOSIS — Z34.83 PRENATAL CARE, SUBSEQUENT PREGNANCY IN THIRD TRIMESTER: ICD-10-CM

## 2024-06-16 PROBLEM — Z3A.39 39 WEEKS GESTATION OF PREGNANCY: Status: ACTIVE | Noted: 2024-06-16

## 2024-06-16 LAB
ABO/RH(D): NORMAL
AMPHETAMINES UR QL SCN: NORMAL
ANTIBODY SCREEN: NEGATIVE
BARBITURATES UR QL SCN: NORMAL
BENZODIAZ UR QL SCN: NORMAL
BZE UR QL SCN: NORMAL
CANNABINOIDS UR QL SCN: NORMAL
ERYTHROCYTE [DISTWIDTH] IN BLOOD BY AUTOMATED COUNT: 16.2 % (ref 10–15)
FENTANYL UR QL: NORMAL
HCT VFR BLD AUTO: 28.8 % (ref 35–47)
HGB BLD-MCNC: 9.1 G/DL (ref 11.7–15.7)
MCH RBC QN AUTO: 24.9 PG (ref 26.5–33)
MCHC RBC AUTO-ENTMCNC: 31.6 G/DL (ref 31.5–36.5)
MCV RBC AUTO: 79 FL (ref 78–100)
OPIATES UR QL SCN: NORMAL
PCP QUAL URINE (ROCHE): NORMAL
PLATELET # BLD AUTO: 342 10E3/UL (ref 150–450)
RBC # BLD AUTO: 3.66 10E6/UL (ref 3.8–5.2)
SPECIMEN EXPIRATION DATE: NORMAL
WBC # BLD AUTO: 11.6 10E3/UL (ref 4–11)

## 2024-06-16 PROCEDURE — 120N000013 HC R&B IMCU

## 2024-06-16 PROCEDURE — 258N000003 HC RX IP 258 OP 636: Mod: JZ | Performed by: OBSTETRICS & GYNECOLOGY

## 2024-06-16 PROCEDURE — 85027 COMPLETE CBC AUTOMATED: CPT | Performed by: OBSTETRICS & GYNECOLOGY

## 2024-06-16 PROCEDURE — 86900 BLOOD TYPING SEROLOGIC ABO: CPT | Performed by: OBSTETRICS & GYNECOLOGY

## 2024-06-16 PROCEDURE — 250N000013 HC RX MED GY IP 250 OP 250 PS 637: Performed by: OBSTETRICS & GYNECOLOGY

## 2024-06-16 PROCEDURE — 80307 DRUG TEST PRSMV CHEM ANLYZR: CPT | Performed by: OBSTETRICS & GYNECOLOGY

## 2024-06-16 PROCEDURE — 86780 TREPONEMA PALLIDUM: CPT | Performed by: OBSTETRICS & GYNECOLOGY

## 2024-06-16 PROCEDURE — 250N000009 HC RX 250: Performed by: OBSTETRICS & GYNECOLOGY

## 2024-06-16 PROCEDURE — 3E033VJ INTRODUCTION OF OTHER HORMONE INTO PERIPHERAL VEIN, PERCUTANEOUS APPROACH: ICD-10-PCS | Performed by: OBSTETRICS & GYNECOLOGY

## 2024-06-16 PROCEDURE — 10907ZC DRAINAGE OF AMNIOTIC FLUID, THERAPEUTIC FROM PRODUCTS OF CONCEPTION, VIA NATURAL OR ARTIFICIAL OPENING: ICD-10-PCS | Performed by: OBSTETRICS & GYNECOLOGY

## 2024-06-16 RX ORDER — NALOXONE HYDROCHLORIDE 0.4 MG/ML
0.2 INJECTION, SOLUTION INTRAMUSCULAR; INTRAVENOUS; SUBCUTANEOUS
Status: DISCONTINUED | OUTPATIENT
Start: 2024-06-16 | End: 2024-06-17 | Stop reason: HOSPADM

## 2024-06-16 RX ORDER — CITRIC ACID/SODIUM CITRATE 334-500MG
30 SOLUTION, ORAL ORAL ONCE
Status: DISCONTINUED | OUTPATIENT
Start: 2024-06-16 | End: 2024-06-17 | Stop reason: HOSPADM

## 2024-06-16 RX ORDER — METHYLERGONOVINE MALEATE 0.2 MG/ML
200 INJECTION INTRAVENOUS
Status: DISCONTINUED | OUTPATIENT
Start: 2024-06-16 | End: 2024-06-17 | Stop reason: HOSPADM

## 2024-06-16 RX ORDER — SODIUM CHLORIDE, SODIUM LACTATE, POTASSIUM CHLORIDE, CALCIUM CHLORIDE 600; 310; 30; 20 MG/100ML; MG/100ML; MG/100ML; MG/100ML
INJECTION, SOLUTION INTRAVENOUS CONTINUOUS
Status: DISCONTINUED | OUTPATIENT
Start: 2024-06-16 | End: 2024-06-17 | Stop reason: HOSPADM

## 2024-06-16 RX ORDER — TRANEXAMIC ACID 10 MG/ML
1 INJECTION, SOLUTION INTRAVENOUS EVERY 30 MIN PRN
Status: DISCONTINUED | OUTPATIENT
Start: 2024-06-16 | End: 2024-06-17 | Stop reason: HOSPADM

## 2024-06-16 RX ORDER — CITRIC ACID/SODIUM CITRATE 334-500MG
30 SOLUTION, ORAL ORAL
Status: DISCONTINUED | OUTPATIENT
Start: 2024-06-16 | End: 2024-06-17 | Stop reason: HOSPADM

## 2024-06-16 RX ORDER — PROCHLORPERAZINE MALEATE 5 MG
10 TABLET ORAL EVERY 6 HOURS PRN
Status: DISCONTINUED | OUTPATIENT
Start: 2024-06-16 | End: 2024-06-17 | Stop reason: HOSPADM

## 2024-06-16 RX ORDER — PROCHLORPERAZINE 25 MG
25 SUPPOSITORY, RECTAL RECTAL EVERY 12 HOURS PRN
Status: DISCONTINUED | OUTPATIENT
Start: 2024-06-16 | End: 2024-06-17 | Stop reason: HOSPADM

## 2024-06-16 RX ORDER — KETOROLAC TROMETHAMINE 30 MG/ML
30 INJECTION, SOLUTION INTRAMUSCULAR; INTRAVENOUS
Status: DISCONTINUED | OUTPATIENT
Start: 2024-06-16 | End: 2024-06-17

## 2024-06-16 RX ORDER — MISOPROSTOL 200 UG/1
800 TABLET ORAL
Status: DISCONTINUED | OUTPATIENT
Start: 2024-06-16 | End: 2024-06-17 | Stop reason: HOSPADM

## 2024-06-16 RX ORDER — OXYTOCIN/0.9 % SODIUM CHLORIDE 30/500 ML
100-340 PLASTIC BAG, INJECTION (ML) INTRAVENOUS CONTINUOUS PRN
Status: DISCONTINUED | OUTPATIENT
Start: 2024-06-16 | End: 2024-06-17

## 2024-06-16 RX ORDER — NALOXONE HYDROCHLORIDE 0.4 MG/ML
0.4 INJECTION, SOLUTION INTRAMUSCULAR; INTRAVENOUS; SUBCUTANEOUS
Status: DISCONTINUED | OUTPATIENT
Start: 2024-06-16 | End: 2024-06-17 | Stop reason: HOSPADM

## 2024-06-16 RX ORDER — SODIUM CHLORIDE, SODIUM LACTATE, POTASSIUM CHLORIDE, CALCIUM CHLORIDE 600; 310; 30; 20 MG/100ML; MG/100ML; MG/100ML; MG/100ML
INJECTION, SOLUTION INTRAVENOUS CONTINUOUS PRN
Status: DISCONTINUED | OUTPATIENT
Start: 2024-06-16 | End: 2024-06-17 | Stop reason: HOSPADM

## 2024-06-16 RX ORDER — NICOTINE 21 MG/24HR
1 PATCH, TRANSDERMAL 24 HOURS TRANSDERMAL DAILY PRN
Status: DISCONTINUED | OUTPATIENT
Start: 2024-06-16 | End: 2024-06-17

## 2024-06-16 RX ORDER — LOPERAMIDE HCL 2 MG
4 CAPSULE ORAL
Status: DISCONTINUED | OUTPATIENT
Start: 2024-06-16 | End: 2024-06-17 | Stop reason: HOSPADM

## 2024-06-16 RX ORDER — IBUPROFEN 800 MG/1
800 TABLET, FILM COATED ORAL
Status: DISCONTINUED | OUTPATIENT
Start: 2024-06-16 | End: 2024-06-17

## 2024-06-16 RX ORDER — OXYTOCIN 10 [USP'U]/ML
10 INJECTION, SOLUTION INTRAMUSCULAR; INTRAVENOUS
Status: DISCONTINUED | OUTPATIENT
Start: 2024-06-16 | End: 2024-06-17

## 2024-06-16 RX ORDER — CARBOPROST TROMETHAMINE 250 UG/ML
250 INJECTION, SOLUTION INTRAMUSCULAR
Status: DISCONTINUED | OUTPATIENT
Start: 2024-06-16 | End: 2024-06-17 | Stop reason: HOSPADM

## 2024-06-16 RX ORDER — METOCLOPRAMIDE 10 MG/1
10 TABLET ORAL EVERY 6 HOURS PRN
Status: DISCONTINUED | OUTPATIENT
Start: 2024-06-16 | End: 2024-06-17 | Stop reason: HOSPADM

## 2024-06-16 RX ORDER — OXYTOCIN/0.9 % SODIUM CHLORIDE 30/500 ML
1-24 PLASTIC BAG, INJECTION (ML) INTRAVENOUS CONTINUOUS
Status: DISCONTINUED | OUTPATIENT
Start: 2024-06-16 | End: 2024-06-17 | Stop reason: HOSPADM

## 2024-06-16 RX ORDER — LOPERAMIDE HCL 2 MG
2 CAPSULE ORAL
Status: DISCONTINUED | OUTPATIENT
Start: 2024-06-16 | End: 2024-06-17 | Stop reason: HOSPADM

## 2024-06-16 RX ORDER — LIDOCAINE 40 MG/G
CREAM TOPICAL
Status: DISCONTINUED | OUTPATIENT
Start: 2024-06-16 | End: 2024-06-17 | Stop reason: HOSPADM

## 2024-06-16 RX ORDER — OXYTOCIN 10 [USP'U]/ML
10 INJECTION, SOLUTION INTRAMUSCULAR; INTRAVENOUS
Status: DISCONTINUED | OUTPATIENT
Start: 2024-06-16 | End: 2024-06-17 | Stop reason: HOSPADM

## 2024-06-16 RX ORDER — METOCLOPRAMIDE HYDROCHLORIDE 5 MG/ML
10 INJECTION INTRAMUSCULAR; INTRAVENOUS EVERY 6 HOURS PRN
Status: DISCONTINUED | OUTPATIENT
Start: 2024-06-16 | End: 2024-06-17 | Stop reason: HOSPADM

## 2024-06-16 RX ORDER — OXYTOCIN/0.9 % SODIUM CHLORIDE 30/500 ML
340 PLASTIC BAG, INJECTION (ML) INTRAVENOUS CONTINUOUS PRN
Status: DISCONTINUED | OUTPATIENT
Start: 2024-06-16 | End: 2024-06-17 | Stop reason: HOSPADM

## 2024-06-16 RX ORDER — TERBUTALINE SULFATE 1 MG/ML
0.25 INJECTION, SOLUTION SUBCUTANEOUS
Status: DISCONTINUED | OUTPATIENT
Start: 2024-06-16 | End: 2024-06-17 | Stop reason: HOSPADM

## 2024-06-16 RX ORDER — ONDANSETRON 2 MG/ML
4 INJECTION INTRAMUSCULAR; INTRAVENOUS EVERY 6 HOURS PRN
Status: DISCONTINUED | OUTPATIENT
Start: 2024-06-16 | End: 2024-06-17 | Stop reason: HOSPADM

## 2024-06-16 RX ORDER — ONDANSETRON 4 MG/1
4 TABLET, ORALLY DISINTEGRATING ORAL EVERY 6 HOURS PRN
Status: DISCONTINUED | OUTPATIENT
Start: 2024-06-16 | End: 2024-06-17 | Stop reason: HOSPADM

## 2024-06-16 RX ORDER — MISOPROSTOL 200 UG/1
400 TABLET ORAL
Status: DISCONTINUED | OUTPATIENT
Start: 2024-06-16 | End: 2024-06-17 | Stop reason: HOSPADM

## 2024-06-16 RX ADMIN — Medication 2 MILLI-UNITS/MIN: at 10:44

## 2024-06-16 RX ADMIN — LIDOCAINE HYDROCHLORIDE 0.5 ML: 10 INJECTION, SOLUTION EPIDURAL; INFILTRATION; INTRACAUDAL; PERINEURAL at 09:52

## 2024-06-16 RX ADMIN — NICOTINE 1 PATCH: 14 PATCH, EXTENDED RELEASE TRANSDERMAL at 15:05

## 2024-06-16 RX ADMIN — SODIUM CHLORIDE, POTASSIUM CHLORIDE, SODIUM LACTATE AND CALCIUM CHLORIDE: 600; 310; 30; 20 INJECTION, SOLUTION INTRAVENOUS at 09:55

## 2024-06-16 RX ADMIN — NICOTINE 1 PATCH: 7 PATCH, EXTENDED RELEASE TRANSDERMAL at 11:33

## 2024-06-16 ASSESSMENT — ACTIVITIES OF DAILY LIVING (ADL)
ADLS_ACUITY_SCORE: 20

## 2024-06-16 NOTE — PLAN OF CARE
Call made to [facility] CPS worker. Left message stating Pt was admitted for IOL and to call back as soon as she could. Number to unit was left on massage.

## 2024-06-16 NOTE — PLAN OF CARE
" Education about position changes to help fetus descend into pelvis provided by nursing. Position changes have been declined through out day. Pt stated \"I didn't have to do this with either of my home births. I just pushed them out when I felt I needed to push and it worked just fine.\" Provider notified on unit.       "

## 2024-06-16 NOTE — H&P
[facility] OB/[facility]N Department    History and Physical Note    Carol Wen  1995  [id number]    HPI: Carol Wen is a 29 year old  at 39w0d by 30w2d US, who presents for scheduled induction of labor due to history of fetal tortuous ductus arch. Reports good fetal movement. No regular contractions, no loss of fluid, no vaginal bleeding.     Pregnancy notable for:  Late and limited prenatal care (dating by 30 week ultrasound)  Tortuous ductus arch on L2 US- M recommends delivery at 39 weeks  History of methamphetamine use in pregnancy (recent UDS negative), alcohol use in pregnancy   Tobacco use in pregnancy   Anemia - declined treatment  Chlamydia infection in pregnancy (ELISABET neg )  Complex social situation (hx homelessness, does not have custody of her other children)    OBHX:   OB History    Para Term  AB Living   11 3 3 0 7 3   SAB IAB Ectopic Multiple Live Births   7 0 0 0 3      # Outcome Date GA Lbr Jeffrey/2nd Weight Sex Type Anes PTL Lv   11 Current            10 Term 21 40w0d   F    FUNMILAYO      Birth Comments: no prenatal care and patient delivered baby herself at home ? weight of baby      Name: Patt   9 Term 19 40w0d  2.722 kg (6 lb) M    FUNMILAYO      Birth Comments: no prenatal care and patient delivered infant herself at home ? weight of 6 pounds      Name: Chan   8 Term 18 40w0d  3.062 kg (6 lb 12 oz) M    FUNMILAYO      Name: Manuel   7 SAB            6 SAB            5 SAB            4 SAB            3 SAB            2 SAB            1 SAB      SAB         Obstetric Comments   Patient states she has had 7 miscarriages before 2018       MedicalHX:   Past Medical History:   Diagnosis Date    Anxiety     Asthma     as teenager    Chickenpox     x3    Depression     PTSD (post-traumatic stress disorder)     Strabismus     Urinary tract infection     with this pregnancy       SurgicalHX:   Past Surgical History:   Procedure Laterality  Date    EYE SURGERY      strabimus at 9 months       Medications:   Current Facility-Administered Medications   Medication Dose Route Frequency Provider Last Rate Last Admin    carboprost (HEMABATE) injection 250 mcg  250 mcg Intramuscular Q15 Min PRN Dorinda Ramirez MD        And    loperamide (IMODIUM) capsule 4 mg  4 mg Oral Once PRN Dorinda Ramirez MD        ketorolac (TORADOL) injection 30 mg  30 mg Intravenous Once PRN Dorinda Ramirez MD        Or    ketorolac (TORADOL) injection 30 mg  30 mg Intramuscular Once PRN Dorinda Ramirez MD        Or    ibuprofen (ADVIL/MOTRIN) tablet 800 mg  800 mg Oral Once PRN Dorinda Ramirez MD        lactated ringers BOLUS 1,000 mL  1,000 mL Intravenous Once PRN Dorinda Ramirez MD        Or    lactated ringers BOLUS 500 mL  500 mL Intravenous Once PRN Dorinda Ramirez MD        lactated ringers BOLUS 500 mL  500 mL Intravenous Once PRN Dorinda Ramirez MD        lactated ringers infusion   Intravenous Continuous PRN Dorinda Ramirez MD        lactated ringers infusion   Intravenous Continuous Dorinda Ramirez  mL/hr at 06/16/24 0955 New Bag at 06/16/24 0955    lidocaine (LMX4) kit   Topical Q1H PRN Dorinda Ramirez MD        lidocaine (LMX4) kit   Topical Q1H PRN Dorinda Ramirez MD        lidocaine 1 % 0.1-1 mL  0.1-1 mL Other Q1H PRDorinda Benoit MD   0.5 mL at 06/16/24 0952    lidocaine 1 % 0.1-1 mL  0.1-1 mL Other Q1H PRDorinda Benoit MD        lidocaine 1 % 0.1-20 mL  0.1-20 mL Subcutaneous Once PRN Dorinda Ramirez MD        loperamide (IMODIUM) capsule 2 mg  2 mg Oral Q2H PRN Dorinda Ramirez MD        Medication Instructions - cervical ripening and induction medications   Does not apply Continuous PRN Dorinda Ramirez MD        methylergonovine  (METHERGINE) injection 200 mcg  200 mcg Intramuscular Q2H PRN Dorinda Ramirez MD        metoclopramide (REGLAN) injection 10 mg  10 mg Intravenous Q6H PRN Dorinda Ramirez MD        Or    metoclopramide (REGLAN) tablet 10 mg  10 mg Oral Q6H PRN Dorinda Ramirez MD        misoprostol (CYTOTEC) tablet 400 mcg  400 mcg Oral ONCE PRN REPEAT PER INSTRUCTIONS Dorinda Ramirez MD        Or    misoprostol (CYTOTEC) tablet 800 mcg  800 mcg Rectal ONCE PRN REPEAT PER INSTRUCTIONS Dorinda Ramirez MD        naloxone (NARCAN) injection 0.2 mg  0.2 mg Intravenous Q2 Min PRN Dorinda Ramirez MD        Or    naloxone (NARCAN) injection 0.4 mg  0.4 mg Intravenous Q2 Min PRN Dorinda Ramirez MD        Or    naloxone (NARCAN) injection 0.2 mg  0.2 mg Intramuscular Q2 Min PRN Dorinda Ramirez MD        Or    naloxone (NARCAN) injection 0.4 mg  0.4 mg Intramuscular Q2 Min PRN Dorinda Ramirez MD        nicotine (NICODERM CQ) 14 MG/24HR 24 hr patch 1 patch  1 patch Transdermal Daily PRN Barabash, Areli, DO        nicotine (NICODERM CQ) 7 MG/24HR 24 hr patch 1 patch  1 patch Transdermal Daily PRN Barabash, Areli, DO        ondansetron (ZOFRAN ODT) ODT tab 4 mg  4 mg Oral Q6H PRN Dorinda Ramirez MD        Or    ondansetron (ZOFRAN) injection 4 mg  4 mg Intravenous Q6H PRN Dorinda Ramirez MD        oxytocin (PITOCIN) 30 units in 500 mL 0.9% NaCl infusion  340 mL/hr Intravenous Continuous PRN Dorinda Ramirez MD        oxytocin (PITOCIN) 30 units in 500 mL 0.9% NaCl infusion  1-24 kellie-units/min Intravenous Continuous Dorinda Ramirez MD        oxytocin (PITOCIN) 30 units in 500 mL 0.9% NaCl infusion  100-340 mL/hr Intravenous Continuous PRN Dorinda Ramirez MD        oxytocin (PITOCIN) injection 10 Units  10 Units Intramuscular Once PRN Dorinda Ramirez,  MD        oxytocin (PITOCIN) injection 10 Units  10 Units Intramuscular Once PRN Dorinda Ramirez MD        prochlorperazine (COMPAZINE) injection 10 mg  10 mg Intravenous Q6H PRN Dorinda Ramirez MD        Or    prochlorperazine (COMPAZINE) tablet 10 mg  10 mg Oral Q6H PRN Dorinda Ramirez MD        Or    prochlorperazine (COMPAZINE) suppository 25 mg  25 mg Rectal Q12H PRN Dorinda Ramirez MD        sodium chloride (PF) 0.9% PF flush 3 mL  3 mL Intracatheter Q8H Dorinda Ramirez MD        sodium chloride (PF) 0.9% PF flush 3 mL  3 mL Intracatheter q1 min prn Dorinda Ramirez MD        sodium chloride (PF) 0.9% PF flush 3 mL  3 mL Intracatheter Q8H Dorinda Ramirez MD   3 mL at 06/16/24 0953    sodium chloride (PF) 0.9% PF flush 3 mL  3 mL Intracatheter q1 min prn Dorinda Ramirez MD        sodium citrate-citric acid (BICITRA) solution 30 mL  30 mL Oral Once PRN Dorinda Ramirez MD        sodium citrate-citric acid (BICITRA) solution 30 mL  30 mL Oral Once Dorinda Ramirez MD        terbutaline (BRETHINE) injection 0.25 mg  0.25 mg Subcutaneous Once PRN Dorinda Ramirez MD        tranexamic acid 1 g in 100 mL NS IV bag (premix)  1 g Intravenous Q30 Min PRN Dorinda Ramirez MD           Allergies:  Allergies   Allergen Reactions    Amoxicillin Hives    Penicillins Swelling     Other Reaction(s): As a Child    Sulfa Antibiotics Hives and Swelling    Sulfamethoxazole-Trimethoprim      Other Reaction(s): As a Child       FamilyHX:    Family History   Problem Relation Age of Onset    Anemia Mother     Kidney Disease Mother     Depression Mother     Autism Spectrum Disorder Brother     Depression Brother     Attention Deficit Disorder Brother     Dementia Maternal Grandmother     Post-Traumatic Stress Disorder (PTSD) Maternal Grandmother     Depression Maternal Grandmother      Diabetes Maternal Grandfather     Heart Surgery Maternal Grandfather     Hypertension Maternal Grandfather     Heart Surgery Paternal Grandmother     Diabetes Paternal Grandmother        SocialHX:   Social History     Socioeconomic History    Marital status: Single   Tobacco Use    Smoking status: Every Day     Current packs/day: 1.00     Types: Cigarettes    Smokeless tobacco: Never    Tobacco comments:     Down to 10 cig/day with pregnancy   Vaping Use    Vaping status: Never Used   Substance and Sexual Activity    Alcohol use: Not Currently     Comment: unsure of quit date not drinking since found out she was pregnant    Drug use: Not Currently     Types: Methamphetamines     Comment: ? last use 3-4 weeks ago    Sexual activity: Yes     Partners: Male       ROS: 10-point ROS negative except as in HPI     Physical Exam:  Vitals:    24 1000   BP: 120/58   BP Location: Right arm   Patient Position: Semi-Huffman's   Cuff Size: Adult Large   Resp: 18   Temp: 97.7  F (36.5  C)   TempSrc: Oral     GEN: AOA x3  CV: No heaves or thrills. No peripheral varicosities  PULM: Equal expansion bilaterally, no accessory muscle use  SVE: 2/50/-2    BSUS: cephalic    NST:   Indication: induction of labor  Baseline: 120 bpm  Variability: moderate  Accelerations: present  Decelerations: absent  TOCO: none  Impression: Reactive NST              Labs:        Lab Results   Component Value Date    AS Negative 2024    HEPBANG Nonreactive 2024    CHPCRT Positive (A) 2024    GCPCRT Negative 2024    HGB 9.1 (L) 2024       GBS Status: negative         A/P: Carol Wen is a 29 year old female  at 39w0d, here for induction of labor for tortuous ductus arch, per M recommendation.     Admit to L&D. Place PIV. Admission labs.   Labor: Will start Pitocin per protocol   FHT: Category 1, Continuous EFM and toco while on Pitocin  Pain: Analgesia PRN, not planning epidural  GBS:   negative     -UDS on  admission due to history of drug use in pregnancy.  -Social work has already reached out regarding this patient, will need automatic CPS referral due to not having custody of her other children.  -Tobacco use: nicotine patch PRN  -Patient requesting to go outside to smoke and take a shower prior to starting induction process, will support these wishes prior to admission and IV placement.     Areli Lau,

## 2024-06-16 NOTE — CONSULTS
Care Transitions Note:    CTS staff received referral for urine tox screening. Results are negative. [facility] is familiar and involved with patient. CM will continue to follow along for cord tissue results and follow up with [facility] on Monday.     Shivani Carnes RN  Care Transitions  [phone]

## 2024-06-16 NOTE — PLAN OF CARE
Data: Patient admitted to room   at 0732. Patient is a . Prenatal record reviewed.   OB History    Para Term  AB Living   11 3 3 0 7 3   SAB IAB Ectopic Multiple Live Births   7 0 0 0 3      # Outcome Date GA Lbr Jeffrey/2nd Weight Sex Type Anes PTL Lv   11 Current            10 Term 21 40w0d   F    FUNMILAYO      Birth Comments: no prenatal care and patient delivered baby herself at home ? weight of baby      Name: Patt   9 Term 19 40w0d  2.722 kg (6 lb) M    FUNMILAYO      Birth Comments: no prenatal care and patient delivered infant herself at home ? weight of 6 pounds      Name: Chan   8 Term 18 40w0d  3.062 kg (6 lb 12 oz) M    FUNMILAYO      Name: Manuel   7 SAB            6 SAB            5 SAB            4 SAB            3 SAB            2 SAB            1 SAB      SAB         Obstetric Comments   Patient states she has had 7 miscarriages before 2018   .  Medical History:   Past Medical History:   Diagnosis Date    Anxiety     Asthma     as teenager    Chickenpox     x3    Depression     PTSD (post-traumatic stress disorder)     Strabismus     Urinary tract infection     with this pregnancy   .  Gestational age 39w0d. Vital signs per doc flowsheet. Fetal movement present. Patient reports No chief complaint on file.   as reason for admission. Support persons are present.  Action: Report from Dipti COX RN obtained at 0700. Care of patient assumed at 0732. Verbal consent for EFM, external fetal monitors applied. Admission assessment completed. Patient and support persons educated on labor process. Patient instructed to report change in fetal movement, contractions, vaginal leaking of fluid or bleeding, abdominal pain, or any concerns related to the pregnancy to her nurse/physician. Patient oriented to room, call light in reach.   Response: Dr. Lau informed of arrival. Plan per provider is IOL. Patient verbalized understanding of education and verbalized agreement with  plan.

## 2024-06-17 ENCOUNTER — ANESTHESIA EVENT (OUTPATIENT)
Dept: OBGYN | Facility: CLINIC | Age: 29
End: 2024-06-17
Payer: COMMERCIAL

## 2024-06-17 ENCOUNTER — ANESTHESIA (OUTPATIENT)
Dept: OBGYN | Facility: CLINIC | Age: 29
End: 2024-06-17
Payer: COMMERCIAL

## 2024-06-17 LAB — T PALLIDUM AB SER QL: NONREACTIVE

## 2024-06-17 PROCEDURE — 722N000001 HC LABOR CARE VAGINAL DELIVERY SINGLE

## 2024-06-17 PROCEDURE — 0HQ9XZZ REPAIR PERINEUM SKIN, EXTERNAL APPROACH: ICD-10-PCS | Performed by: OBSTETRICS & GYNECOLOGY

## 2024-06-17 PROCEDURE — 00HU33Z INSERTION OF INFUSION DEVICE INTO SPINAL CANAL, PERCUTANEOUS APPROACH: ICD-10-PCS | Performed by: NURSE ANESTHETIST, CERTIFIED REGISTERED

## 2024-06-17 PROCEDURE — 10H07YZ INSERTION OF OTHER DEVICE INTO PRODUCTS OF CONCEPTION, VIA NATURAL OR ARTIFICIAL OPENING: ICD-10-PCS | Performed by: OBSTETRICS & GYNECOLOGY

## 2024-06-17 PROCEDURE — 258N000003 HC RX IP 258 OP 636: Mod: JZ | Performed by: OBSTETRICS & GYNECOLOGY

## 2024-06-17 PROCEDURE — 250N000011 HC RX IP 250 OP 636: Performed by: OBSTETRICS & GYNECOLOGY

## 2024-06-17 PROCEDURE — 250N000009 HC RX 250: Performed by: OBSTETRICS & GYNECOLOGY

## 2024-06-17 PROCEDURE — 0UQMXZZ REPAIR VULVA, EXTERNAL APPROACH: ICD-10-PCS | Performed by: OBSTETRICS & GYNECOLOGY

## 2024-06-17 PROCEDURE — 120N000013 HC R&B IMCU

## 2024-06-17 PROCEDURE — 3E0R3BZ INTRODUCTION OF ANESTHETIC AGENT INTO SPINAL CANAL, PERCUTANEOUS APPROACH: ICD-10-PCS | Performed by: NURSE ANESTHETIST, CERTIFIED REGISTERED

## 2024-06-17 PROCEDURE — 370N000003 HC ANESTHESIA WARD SERVICE: Performed by: NURSE ANESTHETIST, CERTIFIED REGISTERED

## 2024-06-17 RX ORDER — ACETAMINOPHEN 325 MG/1
650 TABLET ORAL EVERY 4 HOURS PRN
Status: DISCONTINUED | OUTPATIENT
Start: 2024-06-17 | End: 2024-06-19 | Stop reason: HOSPADM

## 2024-06-17 RX ORDER — LOPERAMIDE HCL 2 MG
2 CAPSULE ORAL
Status: DISCONTINUED | OUTPATIENT
Start: 2024-06-17 | End: 2024-06-19 | Stop reason: HOSPADM

## 2024-06-17 RX ORDER — MISOPROSTOL 200 UG/1
800 TABLET ORAL
Status: DISCONTINUED | OUTPATIENT
Start: 2024-06-17 | End: 2024-06-19 | Stop reason: HOSPADM

## 2024-06-17 RX ORDER — MODIFIED LANOLIN
OINTMENT (GRAM) TOPICAL
Status: DISCONTINUED | OUTPATIENT
Start: 2024-06-17 | End: 2024-06-19 | Stop reason: HOSPADM

## 2024-06-17 RX ORDER — METHYLERGONOVINE MALEATE 0.2 MG/ML
200 INJECTION INTRAVENOUS
Status: DISCONTINUED | OUTPATIENT
Start: 2024-06-17 | End: 2024-06-19 | Stop reason: HOSPADM

## 2024-06-17 RX ORDER — TRANEXAMIC ACID 10 MG/ML
1 INJECTION, SOLUTION INTRAVENOUS EVERY 30 MIN PRN
Status: DISCONTINUED | OUTPATIENT
Start: 2024-06-17 | End: 2024-06-19 | Stop reason: HOSPADM

## 2024-06-17 RX ORDER — OXYTOCIN 10 [USP'U]/ML
10 INJECTION, SOLUTION INTRAMUSCULAR; INTRAVENOUS
Status: DISCONTINUED | OUTPATIENT
Start: 2024-06-17 | End: 2024-06-19 | Stop reason: HOSPADM

## 2024-06-17 RX ORDER — BISACODYL 10 MG
10 SUPPOSITORY, RECTAL RECTAL DAILY PRN
Status: DISCONTINUED | OUTPATIENT
Start: 2024-06-17 | End: 2024-06-19 | Stop reason: HOSPADM

## 2024-06-17 RX ORDER — DOCUSATE SODIUM 100 MG/1
100 CAPSULE, LIQUID FILLED ORAL DAILY
Status: DISCONTINUED | OUTPATIENT
Start: 2024-06-17 | End: 2024-06-19 | Stop reason: HOSPADM

## 2024-06-17 RX ORDER — OXYTOCIN/0.9 % SODIUM CHLORIDE 30/500 ML
340 PLASTIC BAG, INJECTION (ML) INTRAVENOUS CONTINUOUS PRN
Status: DISCONTINUED | OUTPATIENT
Start: 2024-06-17 | End: 2024-06-19 | Stop reason: HOSPADM

## 2024-06-17 RX ORDER — CARBOPROST TROMETHAMINE 250 UG/ML
250 INJECTION, SOLUTION INTRAMUSCULAR
Status: DISCONTINUED | OUTPATIENT
Start: 2024-06-17 | End: 2024-06-19 | Stop reason: HOSPADM

## 2024-06-17 RX ORDER — MISOPROSTOL 200 UG/1
400 TABLET ORAL
Status: DISCONTINUED | OUTPATIENT
Start: 2024-06-17 | End: 2024-06-19 | Stop reason: HOSPADM

## 2024-06-17 RX ORDER — NALBUPHINE HYDROCHLORIDE 10 MG/ML
2.5-5 INJECTION, SOLUTION INTRAMUSCULAR; INTRAVENOUS; SUBCUTANEOUS EVERY 6 HOURS PRN
Status: DISCONTINUED | OUTPATIENT
Start: 2024-06-17 | End: 2024-06-17

## 2024-06-17 RX ORDER — FENTANYL CITRATE-0.9 % NACL/PF 10 MCG/ML
100 PLASTIC BAG, INJECTION (ML) INTRAVENOUS EVERY 5 MIN PRN
Status: DISCONTINUED | OUTPATIENT
Start: 2024-06-17 | End: 2024-06-17 | Stop reason: HOSPADM

## 2024-06-17 RX ORDER — IBUPROFEN 800 MG/1
800 TABLET, FILM COATED ORAL EVERY 6 HOURS PRN
Status: DISCONTINUED | OUTPATIENT
Start: 2024-06-17 | End: 2024-06-19 | Stop reason: HOSPADM

## 2024-06-17 RX ORDER — LOPERAMIDE HCL 2 MG
4 CAPSULE ORAL
Status: DISCONTINUED | OUTPATIENT
Start: 2024-06-17 | End: 2024-06-19 | Stop reason: HOSPADM

## 2024-06-17 RX ORDER — HYDROCORTISONE 25 MG/G
CREAM TOPICAL 3 TIMES DAILY PRN
Status: DISCONTINUED | OUTPATIENT
Start: 2024-06-17 | End: 2024-06-19 | Stop reason: HOSPADM

## 2024-06-17 RX ADMIN — SODIUM CHLORIDE, POTASSIUM CHLORIDE, SODIUM LACTATE AND CALCIUM CHLORIDE 500 ML: 600; 310; 30; 20 INJECTION, SOLUTION INTRAVENOUS at 07:37

## 2024-06-17 RX ADMIN — SODIUM CHLORIDE, POTASSIUM CHLORIDE, SODIUM LACTATE AND CALCIUM CHLORIDE: 600; 310; 30; 20 INJECTION, SOLUTION INTRAVENOUS at 05:01

## 2024-06-17 RX ADMIN — SODIUM CHLORIDE, POTASSIUM CHLORIDE, SODIUM LACTATE AND CALCIUM CHLORIDE: 600; 310; 30; 20 INJECTION, SOLUTION INTRAVENOUS at 09:23

## 2024-06-17 RX ADMIN — Medication: at 08:16

## 2024-06-17 RX ADMIN — Medication 340 ML/HR: at 13:44

## 2024-06-17 ASSESSMENT — ACTIVITIES OF DAILY LIVING (ADL)
ADLS_ACUITY_SCORE: 20
ADLS_ACUITY_SCORE: 21
ADLS_ACUITY_SCORE: 21
ADLS_ACUITY_SCORE: 20
ADLS_ACUITY_SCORE: 21
ADLS_ACUITY_SCORE: 20
ADLS_ACUITY_SCORE: 21
ADLS_ACUITY_SCORE: 20
ADLS_ACUITY_SCORE: 21
ADLS_ACUITY_SCORE: 20

## 2024-06-17 ASSESSMENT — LIFESTYLE VARIABLES: TOBACCO_USE: 1

## 2024-06-17 NOTE — PROGRESS NOTES
This writer notified Magnolia Smith ([facility]) and Shivani ([facility] CPS screener) to report the birth of baby

## 2024-06-17 NOTE — PROGRESS NOTES
"[facility] OB/GYN Department    Labor Note    Date of Admission: 6/16/2024  Name: Carol Wen    Subjective:    Patient starting to feel contractions stronger.    Objective:    Vital signs:  Temp: 98.2  F (36.8  C) Temp src: Oral BP: 128/69 Pulse: 103   Resp: 18 SpO2: 90 % O2 Device: None (Room air)        Estimated body mass index is 42.38 kg/m  as calculated from the following:    Height as of 6/11/24: 1.562 m (5' 1.5\").    Weight as of 6/11/24: 103.4 kg (228 lb).          FHR:  130s baseline, moderate variability (periods of minimal), + accelerations, one late deceleration in past hour  Uterine Contractions:  q2-3min, Pitocin at 22mU/min  Sterile Vaginal Exam:  3/70/-2, AROM with clear fluid    Assessment and Plan:    Continue with Pitocin per protocol.      Areli Lau DO    "

## 2024-06-17 NOTE — PROGRESS NOTES
Up to BR for pericare. Pt was able to void. Mother and baby transferred to postpartum unit at 1630 via bogdan saxena and edilia after completion of immediate recovery period. Patient oriented to room and instructed to call for assistance when up to the bathroom the next time. Report given to this writer RN who assumes patient care. Mother and baby bonding well and in stable condition upon transfer.

## 2024-06-17 NOTE — ANESTHESIA PROCEDURE NOTES
Epidural catheter Procedure Note    Pre-Procedure   Staff -        CRNA: Ann Niño APRN CRNA       Performed By: CRNA       Location: OB       Procedure Start/Stop Times: 6/17/2024 7:58 AM and 6/17/2024 8:20 AM       Pre-Anesthestic Checklist: patient identified, IV checked, risks and benefits discussed, informed consent, monitors and equipment checked, pre-op evaluation, at physician/surgeon's request and post-op pain management  Timeout:       Correct Patient: Yes        Correct Procedure: Yes        Correct Site: Yes        Correct Position: Yes   Procedure Documentation  Procedure: epidural catheter       Diagnosis: Labor pain       Patient Position: sitting       Patient Prep/Sterile Barriers: sterile gloves, mask, patient draped       Skin prep: Chloraprep       Local skin infiltrated with 1 mL of 1% lidocaine.        Insertion Site: L3-4. (midline approach).       Technique: LORT saline and LORT air        ZAHRA at 7 cm.       Needle Type: ToSensingStripy needle       Needle Gauge: 17.        Needle Length (Inches): 3.5        Catheter: 20 G.          Catheter threaded easily.           Threaded 13 cm at skin.         # of attempts: 1 and  # of redirects:  0    Assessment/Narrative         Paresthesias: No.       Test dose of 3 mL lidocaine 1.5% w/ 1:200,000 epinephrine at 08:06 CDT.         Test dose negative, 3 minutes after injection, for signs of intravascular, subdural, or intrathecal injection.       Insertion/Infusion Method: LORT saline and LORT air       Aspiration negative for Heme or CSF via Epidural Catheter.    Medication(s) Administered   Medication Administration Time: 6/17/2024 7:58 AM     Comments:  Epidural   Risks, benefits and alternatives discussed, patient agrees to have an epidural catheter placement.   Local infiltration of skin, advancement of needle without paresthesias, excellent ZAHRA with NS.   Catheter advanced without resistance nor paresthesias, negative aspiration for heme or  "CSF.  Administered epidural bolus with remaining 2 mL epidural test dose diluted with 3 mL sterile water.  Prior to epidural placement patient rated pain 10/10, patient rates pain 2/10 post epidural placement.  Patient tolerated the procedure well, all questions answered.      FOR [facility] ([facility]) ONLY:   Pain Team Contact information: please page the Pain Team Via [employer]. Search \"Pain\". During daytime hours, please page the attending first. At night please page the resident first.      "

## 2024-06-17 NOTE — PROGRESS NOTES
"S:Delivery  B:Induced  Labor,39w1d    No results found for: \"GBS\" with antibiotic treatment not indicated 4 hours prior to delivery.  A: Patient delivered   lac 1st degree and periurethral lac at 1341 with Dr. DELMY Ramirez in attendance and baby placed on mother's abdomen for delayed cord clamping. Baby dried and stimulated. Baby placed  skin to skin @ 1342. Apgars 9/9. Delivery QBL 50mL.  IV infusion of Oxytocin  infused. Placenta removal spontaneous. MD does not want placenta sent to pathology.  See Flowsheet for VS and PP checks. Delivery QBL (mL): 50 mL.  Labor care plan goals met, transition now to postpartum care. Postpartum QBL pending.  R: Expect routine postpartum care. Anticipate first feeding within the hour or whenever infant displays feeding cues. Continue skin to skin. Prior discussion with mother indicates that feeding plan is Formula feeding. Educated mother on importance of exclusive breastfeeding, expected feeding readiness cues and encouraged her to observe for these cues while rooming in. Informed her that breastfeeding assistance would be provided.  "

## 2024-06-17 NOTE — ANESTHESIA POSTPROCEDURE EVALUATION
Patient: Carol Wen    Procedure: * No procedures listed *       Anesthesia Type:  Epidural    Note:  Disposition: Inpatient   Postop Pain Control: Uneventful            Sign Out: Well controlled pain   PONV: No   Neuro/Psych: Uneventful            Sign Out: Acceptable/Baseline neuro status   Airway/Respiratory: Uneventful            Sign Out: Acceptable/Baseline resp. status   CV/Hemodynamics: Uneventful            Sign Out: Acceptable CV status; No obvious hypovolemia; No obvious fluid overload   Other NRE: NONE   DID A NON-ROUTINE EVENT OCCUR? No           Last vitals:  Vitals:    06/17/24 1505 06/17/24 1518 06/17/24 1533   BP: 118/78 127/68 121/68   Pulse:      Resp: 20  16   Temp:      SpO2:          Electronically Signed By: KAN Lopez CRNA  June 17, 2024  6:12 PM

## 2024-06-17 NOTE — PROGRESS NOTES
"[facility] OB/GYN Department    Labor Note    Date of Admission: 6/16/2024  Name: Carol Wen    Subjective:    Patient uncomfortable with contractions. She has been in and out of the bathtub all night. Has been resistant to most other interventions all night. Insisted on stopping Pitocin at one point due to discomfort. Offered all other pain management options and declined. Has been resistant to fetal monitoring attempts as it has been very difficult to trace FHT and toco well. Refusing cervical exams overnight.    Objective:    Vital signs:  Temp: 97.9  F (36.6  C) Temp src: Oral BP: 132/85 Pulse: 99   Resp: 16 SpO2: 90 % O2 Device: None (Room air)        Estimated body mass index is 42.38 kg/m  as calculated from the following:    Height as of 6/11/24: 1.562 m (5' 1.5\").    Weight as of 6/11/24: 103.4 kg (228 lb).          FHR:  130s baseline, moderate variability, + accelerations, no decelerations  Uterine Contractions:  q2-5min  Sterile Vaginal Exam:  5-6/80/-1    Assessment and Plan:    Have been in patient's room on a number of attempts to discuss labor progress and options for going forward. Patient getting frustrated with process and wanting sleep. Very uncomfortable with contractions. She has been offered alternative pain management options all night, including epidural. She has been declining all night. Patient was finally agreeable to cervical exam this morning. Discussed minimal labor progress overnight. We did do a Pitocin break at one point as patient was refusing medication. Have now been increasing the Pitocin again, but patient is now again refusing medication increase. Discussed lack of cervical change and need to progress with labor. Had a discussion this morning with the patient and her family members. Given options of placement of IUPC for improved contraction monitoring +/- epidural or proceeding with C section for lack of progress. Discussed need to monitor for contraction " strength and frequency as an assessment to see if contractions are adequate to cause cervical change. At first patient was thinking she wanted a C section. After a long discussion with the patient, she was agreeable with IUPC placement and this was placed. She is also agreeable with epidural placement at this point. Aware that she might still need a C section if contractions are adequate and she is not making cervical change. Will continue to monitor closely and assess.       Areli Lau, DO

## 2024-06-17 NOTE — PROGRESS NOTES
Pt has been sound asleep since the epidural.  Will continue to go up in the pit per protocol.  Will do a SVE, temp and place a friend when pt is awake.

## 2024-06-17 NOTE — PROGRESS NOTES
Pt requesting to have iv removed so that she can go smoke.  Instructed on hospital policy.  Not to smoke around baby and that she needs to wash hands when she comes back.  Also instructed that she needs to have someone go with her.

## 2024-06-17 NOTE — PROGRESS NOTES
CPS called and stated they will be here tomorrow around 10:00 AM and probably put the baby on a 72 hour hold.  They will explain how that works to the parents when they come.  Mom did hold the baby after delivery and fed the baby a bottle.  Dad still has not held the baby.  Shortly after the baby was born, the dad stated he had to go smoke, he did even wait to see what the baby weighed.  When the dad came back, he sat down and passed out asleep sitting up.  Right now, the grandma asked the mom if she wanted to hold her baby and she stated, no she wants to sleep.  Will continue to monitor the bonding and if the parents are doing the cares for the baby.

## 2024-06-17 NOTE — PLAN OF CARE
"VSS. Adequate fluid intake and urine output.    1925: Contractions rated 4/10. Comfort measures include family support, 1:1 RN care, and increased activity. Patellar reflexes 2+, no clonus. Generalized 1+ edema. Denies pre-eclampsia symptoms. Reports urinary frequency, no pain. Assessment otherwise WNL. Patient wants unmedicated labor and delivery, states \"I know many people who have bad reactions.\" Patient worries about becoming paralyzed if she receives an epidural. Patient also does not wish to become further restricted.    2008: Contractions rated 6/10. Utilizing family as distraction.    2015: 3cm, 70%, -2. AROM by provider. Moderate amount of clear fluid.    2058: Patient requesting to take a bath. Susy placed to attempt improved monitoring. Spikey balls given to allow for distraction while patient remains in bed.    2130: Patient in the bath. Utilizing bath salts calm blend. Receiving back massage from SO.    2155: Ambulating in hallway.    2211: Pitocin increased to 24 units.    2215: Pain rated 8/10. Distraction utilized. Increased activity. Patient encouraged to complete position changes or to utilize birthing ball, declines.    2251: Cervix 4cm, 70%, -2. Does not tolerate well, rolls around in bed, unable to take deep breaths through check.    2254: Pain rated 9/10. Back in bath, receiving back massage from SO.    2300: Provider updated on patient status. Staff attempting to switch and readjust monitor, patient allowing staff to do so, but once out of bath patient did not want to be touched until back in bed. Patient is annoyed that staff has to readjust monitor frequently. Patient was offered FSE multiple times but has declined.    2322: Patient back in bed. Monitor in place. VSS. Patient unhappy about VS being obtained frequently. Reports that the blood pressure cuff is too painful. Pain 9/10. Utilizing hot packs to back. No changes in assessment.    0010: Patient reports 10/10 pain and inability to " "cope with labor. Offered nitrous oxide. Patient agreed, instructed on use, signed informed consent. Applied at 0020.    0025: Patient requested a break from pitocin, states \"my body is going through it now.\" Provider updated.    0045: Writer requested to check cervix, patient stated \"I can tell you it doesn't feel like I have to shit so we're nowhere close.\" Per provider okay to shut off pitocin for 20-25 minutes; Restart at half dose if contractions space. Orders explained to patient. Patient happy that pitocin will be shut off, but irritable that it will be restarted so quickly. States \"well if I'm asleep just leave me alone.\" Pitocin shut off at 0046. Patient upset that vitals were obtained.    0108: Provider updated on patient's contractions. Provider would like pitocin restarted at 12 units.    0110: Writer spoke to patient about plan. Patient wants pitocin to stay off, no longer wants hourly vitals, does not wish for the monitor to be adjusted as frequently. Patient becoming more agitated. Verbalizes frustration that she is unable to smoke and is being confined to a small area. Irritated that family is able to sleep but she is not. Patient reports that her mother is annoying her and is \"supposed to be on my side\" but is not being supportive. Patient is annoyed that she has not delivered yet, and is sick of being disturbed by staff so often. States \"I'm about to start yelling at people and kicking nurses out of my room.\"    0124: Provider spoke with patient and SO about patient's concerns and provider gave options for plan of care. Patient declines FSE and epidural. Agreeable to restarting pitocin. Plans to take a bath for comfort.    0150: Pitocin started at 2 units. Provider wants pitocin increased by 2 units every 30 minutes.    0155: Patient back in bath.    0220: Pitocin increased to 4 units. Lavender essential oil given to patient per her mother's request. Patient reports that her contractions are already " "10/10. No contractions picking up on monitor. Declines assistance/intervention from staff.    0250: Pitocin 6 units. Patient asleep, RR 16.    0320: Pitocin 8 units. Patient asleep, snoring.    0350: Pitocin 10 units. Patient asleep, snoring. When awoken by noise, patient began rolling in bed moaning. Declines assistance/intervention from staff.    0420: Pitocin 12 units. Patient asleep, snoring.    0450: Pitocin 14 units. Patient awake to urinate, reports 10/10 contractions. Rolling in bed moaning. Declines assistance/intervention from staff. Patient upset that monitor needs to be readjusted, states \"didn't you just do it.\" Educated on purpose of monitoring.    0525: Patient refused pitocin increase. States \"I can't take anymore.\" Patient rocking in bed. SO giving back massage. Does not want staff in room. Provider updated.    0605: Patient in bathtub.    0640: Patient back in bed, rocking. Declines interventions from nursing staff.    0657: Provider checked cervix; 5cm. Provider asked patient to place IUPC. Patient hesitant and asked for other options. Provider offered epidural or . Patient would like time to talk to family.    0720: Patient chose . Writer and patient's oncoming nurse in room for provider and patient/family conversation about risks/benefits, questions/concerns. Patient then asked if she could try the epidural first. Patient agreed to IUPC; Placed at 0733.    Problem: Adult Inpatient Plan of Care  Goal: Plan of Care Review  Description: The Plan of Care Review/Shift note should be completed every shift.  The Outcome Evaluation is a brief statement about your assessment that the patient is improving, declining, or no change.  This information will be displayed automatically on your shift  note.  Outcome: Progressing  Goal: Patient-Specific Goal (Individualized)  Description: You can add care plan individualizations to a care plan. Examples of Individualization might be:  \"Parent " "requests to be called daily at 9am for status\", \"I have a hard time hearing out of my right ear\", or \"Do not touch me to wake me up as it startles  me\".  Outcome: Progressing  Goal: Absence of Hospital-Acquired Illness or Injury  Outcome: Progressing  Intervention: Prevent Skin Injury  Recent Flowsheet Documentation  Taken 6/16/2024 1925 by Campos Oneal RN  Body Position: position changed independently  Goal: Optimal Comfort and Wellbeing  Outcome: Progressing  Intervention: Monitor Pain and Promote Comfort  Recent Flowsheet Documentation  Taken 6/16/2024 1925 by Campos Oneal RN  Pain Management Interventions: (Increased activity) other (see comments)  Intervention: Provide Person-Centered Care  Recent Flowsheet Documentation  Taken 6/16/2024 1925 by Campos Oneal RN  Trust Relationship/Rapport:   care explained   choices provided   emotional support provided   questions encouraged   questions answered   thoughts/feelings acknowledged  Goal: Readiness for Transition of Care  Outcome: Progressing     Problem: Suicide Risk  Goal: Absence of Self-Harm  Outcome: Progressing  Intervention: Promote Psychosocial Wellbeing  Recent Flowsheet Documentation  Taken 6/16/2024 1925 by Campos Oneal RN  Sleep/Rest Enhancement:   comfort measures   family presence promoted   relaxation techniques promoted   room darkened     Problem: Labor  Goal: Hemostasis  Outcome: Progressing  Goal: Stable Fetal Wellbeing  Outcome: Progressing  Intervention: Promote and Monitor Fetal Wellbeing  Recent Flowsheet Documentation  Taken 6/16/2024 1925 by Campos Oneal RN  Body Position: position changed independently  Goal: Effective Progression to Delivery  Outcome: Progressing  Goal: Absence of Infection Signs and Symptoms  Outcome: Progressing  Goal: Acceptable Pain Control  Outcome: Progressing  Goal: Normal Uterine Contraction Pattern  Outcome: Progressing   Goal Outcome Evaluation:                      Campos Oneal RN on " 6/17/2024 at 7:44 AM

## 2024-06-17 NOTE — L&D DELIVERY NOTE
Carol Wen is a 29 year old  presented at 39w1d who was admitted for IOL. She received pitocin and AROM and slowly reached complete dilation. Patient did not labor down. She pushed effectively and delivered a viable male infant with Apgars 9/9 over a 1st degree laceration. Weight is pending and skin-to-skin was performed. Delayed cord clamping was not performed. Cord was clamped and cut. Placenta delivered via active management and was noted to be intact with a three vessel cord.   1st degree laceration was repaired with 3-0 vicryl in standard fashion. Periurethral on right small and hemostatic with pressure. QBL 50ml. Sponge and needle counts correct. Mom and baby were transported to postpartum in stable condition.     Dorinda Ramirez MD   2024 2:01 PM   OB Vaginal Delivery Note    Carol Wen MRN# [id number]   Age: 29 year old YOB: 1995       GA: 39w1d  GP:   Labor Complications: Dysfunctional Labor   EBL:   mL  Delivery QBL: 50 mL  Delivery Type: Vaginal, Spontaneous   ROM to Delivery Time: (Delivered) Hours: 17 Minutes: 26  [place] Weight:     1 Minute 5 Minute 10 Minute   Apgar Totals: 9   9        JU VAN;SHAN SCHNEIDER;JENNIFER CHARLES     Delivery Details:  Carol Wen, a 29 year old  female delivered a viable infant with apgars of 9  and 9 . Patient was fully dilated and pushing after   hours   minutes in active labor. Delivery was via vaginal, spontaneous  to a sterile field under epidural  anesthesia. Infant delivered in vertex    occiput  anterior  position. Anterior and posterior shoulders delivered without difficulty. The cord was clamped, cut twice and 3 vessels  were noted. Cord blood was obtained in routine fashion with the following disposition: lab .      Cord complications: nuchal   Placenta delivered at 2024  1:44 PM . Placental disposition was  . Fundal massage performed and fundus found to be firm.      Episiotomy: none    Perineum, vagina, cervix were inspected, and the following lacerations were noted:   Perineal lacerations: 1st   periurethral laceration: right             Any lacerations were repaired in the usual fashion using 3-0 vicryl.    Excellent hemostasis was noted. Needle count correct. Infant and patient in delivery room in good and stable condition.        Lucho Wen [[id number]]      Labor Event Times      Latent labor onset date/time: 2024    Active labor onset date: 24 Onset time: 11:00 AM   Dilation complete date: 24 Complete time:  1:13 PM   Start pushing date/time: 2024 1320          Labor Events     labor?: No   steroids: None  Labor Type: Induction/Cervical ripening  Predominate monitoring during 1st stage: continuous electronic fetal monitoring     Antibiotics received during labor?: No     Rupture identifier: Sac 1  Rupture date/time: 24   Rupture type: Artificial Rupture of Membranes  Fluid color: Clear  Fluid odor: Normal     Induction: Oxytocin, AROM  Induction date/time: 24 1044    Cervical ripening date/time:           Augmentation: Oxytocin  Indications for augmentation: Ineffective Contraction Pattern       Delivery/Placenta Date and Time      Delivery Date: 24 Delivery Time:  1:41 PM   Placenta Date/Time: 2024  1:44 PM  Oxytocin given at the time of delivery: after delivery of baby  Delivering clinician: Dorinda Ramirez MD   Other personnel present at delivery:  Provider Role   Aishwarya Pineda RN Charge Nurse   Janine Oden RN Delivery Nurse   Beth Echevarria APRN CNP Nurse Practitioner             Vaginal Counts       Initial count performed by 2 team members:  Two Team Members   IVY Singletary RN         Needles Suture Needles Sponges (RETIRED) Instruments   Initial counts 2  5    Added to count  1     Relief counts       Final counts 2 1 5            Placed  during labor Accounted for at the end of labor   FSE No NA   IUPC Yes Yes   Cervidil No NA                  Final count performed by 2 team members:  Two Team Members   Edwin Ramirez      Final count correct?: Yes  Pre-Birth Team Brief: Complete  Post-Birth Team Debrief: Complete       Apgars    Living status: Living   1 Minute 5 Minute 10 Minute 15 Minute 20 Minute   Skin color: 1  1       Heart rate: 2  2       Reflex irritability: 2  2       Muscle tone: 2  2       Respiratory effort: 2  2       Total: 9  9       Apgars assigned by: JANINE SCHNEIDER RN       Cord      Vessels: 3 Vessels    Cord Complications: Nuchal   Nuchal Intervention: reduced         Nuchal cord description: loose nuchal cord         Cord Blood Disposition: Lab    Gases Sent?: No    Delayed cord clamping?: Yes    Cord Clamping Delay (seconds):  seconds    Stem cell collection?: No           Equality Resuscitation    Methods: None       Labor Events and Shoulder Dystocia    Fetal Tracing Prior to Delivery: Category 2  Shoulder dystocia present?: Neg       Delivery (Maternal) (Provider to Complete) ([id number])    Episiotomy: None  Perineal lacerations: 1st Repaired?: Yes     Periurethral laceration: right Repaired?: No   Repair suture: 3-0 Vicryl  Genital tract inspection done: Pos       Blood Loss  Mother: Carol Wen #[id number]     Start of Mother's Information      Delivery Blood Loss  24 1100 - 24 1359      Delivery QBL (mL) Hospital Encounter 50 mL    Total  50 mL               End of Mother's Information  Mother: Carol Wen #[id number]                Delivery - Provider to Complete ([id number])    Delivering clinician: Dorinda Ramirez MD  Delivery Type (Choose the 1 that will go to the Birth History): Vaginal, Spontaneous                         Other personnel:  Provider Role   Aishwarya Pineda RN Charge Nurse   Janine Schneider RN Delivery Nurse   Beth Echevarria APRN CNP Nurse Practitioner                     Placenta    Date/Time: 6/17/2024  1:44 PM  Removal: Spontaneous             Anesthesia    Method: Epidural  Cervical dilation at placement: 4-7                    Presentation and Position    Presentation: Vertex     Occiput Anterior                     Dorinda Ramirez MD

## 2024-06-17 NOTE — ANESTHESIA PREPROCEDURE EVALUATION
Anesthesia Pre-Procedure Evaluation    Patient: Carol Wen   MRN: [id number] : 1995        Procedure :           Past Medical History:   Diagnosis Date    Anxiety     Asthma     as teenager    Chickenpox     x3    Depression     PTSD (post-traumatic stress disorder)     Strabismus     Urinary tract infection     with this pregnancy      Past Surgical History:   Procedure Laterality Date    EYE SURGERY      strabimus at 9 months      Allergies   Allergen Reactions    Amoxicillin Hives    Penicillins Swelling     Other Reaction(s): As a Child    Sulfa Antibiotics Hives and Swelling    Sulfamethoxazole-Trimethoprim      Other Reaction(s): As a Child      Social History     Tobacco Use    Smoking status: Every Day     Current packs/day: 1.00     Types: Cigarettes    Smokeless tobacco: Never    Tobacco comments:     Down to 10 cig/day with pregnancy   Substance Use Topics    Alcohol use: Not Currently     Comment: unsure of quit date not drinking since found out she was pregnant      Wt Readings from Last 1 Encounters:   24 103.4 kg (228 lb)        Anesthesia Evaluation   Pt has had prior anesthetic.         ROS/MED HX  ENT/Pulmonary:     (+)                tobacco use, Current use,     asthma                  Neurologic:       Cardiovascular:  - neg cardiovascular ROS     METS/Exercise Tolerance:     Hematologic:  - neg hematologic  ROS     Musculoskeletal:  - neg musculoskeletal ROS     GI/Hepatic:     (+) GERD,                   Renal/Genitourinary:  - neg Renal ROS     Endo:  - neg endo ROS     Psychiatric/Substance Use:     (+) psychiatric history (PTSD) anxiety  H/O chronic opiod use . Recreational drug usage: Meth (H/O recreational drug use).    Infectious Disease:  - neg infectious disease ROS     Malignancy:  - neg malignancy ROS     Other:      (+)  , ,, other significant disability (Homelessness)          Physical Exam    Airway        Mallampati: II   TM distance: > 3 FB   Neck ROM: full  "  Mouth opening: > 3 cm    Respiratory Devices and Support         Dental           Cardiovascular   cardiovascular exam normal          Pulmonary   pulmonary exam normal                OUTSIDE LABS:  CBC:   Lab Results   Component Value Date    WBC 11.6 (H) 06/16/2024    WBC 13.4 (H) 04/22/2024    HGB 9.1 (L) 06/16/2024    HGB 9.4 (L) 04/22/2024    HCT 28.8 (L) 06/16/2024    HCT 29.5 (L) 04/22/2024     06/16/2024     04/22/2024     BMP:   Lab Results   Component Value Date     04/15/2024     12/04/2006    POTASSIUM 3.4 04/15/2024    POTASSIUM 4.1 12/04/2006    CHLORIDE 103 04/15/2024    CHLORIDE 105 12/04/2006    CO2 24 04/15/2024    CO2 27 12/04/2006    BUN 6.4 04/15/2024    BUN 12 12/04/2006    CR 0.42 (L) 04/22/2024    CR 0.41 (L) 04/15/2024    GLC 68 (L) 04/15/2024    GLC 68 12/04/2006     COAGS: No results found for: \"PTT\", \"INR\", \"FIBR\"  POC:   Lab Results   Component Value Date    HCG Positive (A) 04/15/2024    HCGS Negative 12/04/2006     HEPATIC:   Lab Results   Component Value Date    ALBUMIN 3.3 (L) 04/22/2024    PROTTOTAL 6.8 04/22/2024    ALT 23 04/22/2024    AST 25 04/22/2024    ALKPHOS 96 04/22/2024    BILITOTAL <0.2 04/22/2024     OTHER:   Lab Results   Component Value Date    LIZZY 9.0 04/15/2024    TSH 0.56 12/04/2006    T4 8.3 12/04/2006    T3 141 11/08/2006       Anesthesia Plan    ASA Status:  2    - Procedure: Procedure only, no anesthetic delivered      Anesthesia Type: Epidural.              Consents    Anesthesia Plan(s) and associated risks, benefits, and realistic alternatives discussed. Questions answered and patient/representative(s) expressed understanding.     - Discussed: Risks, Benefits and Alternatives for BOTH SEDATION and the PROCEDURE were discussed     - Discussed with:  Patient            Postoperative Care            Comments:               KAN Quach CRNA    I have reviewed the pertinent notes and labs in the chart from the past 30 days and " (re)examined the patient.  Any updates or changes from those notes are reflected in this note.

## 2024-06-17 NOTE — PROGRESS NOTES
S: Delivery  B:Induced  Labor at 39+1 weeks gestation   Mom's GBS status Negative with antibiotic treatment not indicated 4 hours prior to delivery. Cord blood was Discarded. Maternal risk assessment for toxicology completed and an umbilical cord segment was sent to lab following chain of custody, to test for maternal drug use. Mother is aware that the cord will be tested.Care transitions was notified.  A: Patient was a Vaginal delivery at 1341 with DELMY Ramirez in attendance and baby placed on mother's abdomen for delayed cord clamping. Baby dried and stimulated. Baby placed skin to skin on mother's chest within 5 minutes following delivery. Apgars 9/9.  R: Expect routine [place] care. Anticipated first feeding within the hour. Infant has displayed feeding cues. Will continue skin to skin. [place] Provider notified  and at bedside. as is appropriate.

## 2024-06-17 NOTE — PLAN OF CARE
Received return call from [facility] CPS worker, Radha Osei. She informed this writer that pt does does not have a safe discharge plan at this time. CPS instructed to Call the 24hr crisis line at [phone], when infant is born. CPS will call the unit for further instruction of plan of care on Monday morning.   Provider notified

## 2024-06-17 NOTE — PLAN OF CARE
Cat 1  Pit infusing @22MU  LR infusing at 105ml  Last SVE- 2.5/50-60/-2  Encouraged to utilized movements educated about throughout day.

## 2024-06-17 NOTE — PROGRESS NOTES
Care Management Note:    CM team continues to follow pt since 5/28/24, when CM department received direct request from [facility], [facility] CPS staff (cell [phone]), informing this writer that pt was actively followed in [place] by CPS with 3 known termination of parental rights cases due to chronic drugs abuse and mental health issues.       Per Radha [facility] CPS requested that [facility] CPS be contacted when pt admits to the hospital, with UDS screen done at admission & for immediate CPS report to be made at time of birth (report can be 24/7 via phone at [phone])    6/16:  Pt admitted to the hospital for labor & delivery.  Birth Center staff contacted [facility] CPS staff, left message, indicating that pt hospitalized.      CM team also informed pt hospitalized and UDS screen negative, but that cord tissue to be sent upon birth.    6/17:  DAGO reviewed EMR, noted again that UDS screen was negative, UDS screen on 4/22 was negative, however, pt with positive UDS on 4/15/24 for opiates.    DAGO notified Radha Osei, [facility] CPS, that pt is hospitalized, UDS negative and pt has not delivered yet.    Radha informed writer that pt is assigned Nelly Velez, CPS Investigator,   Email:  [email or web address]   Cellphone:  [phone].    DAGO spoke with RNJanine and informed that writer was in contact with [facility] CPS staff, that a HEATH has been received from [facility] CPS staff, and that this writer would be following pt post birth.  Rylie had several questions about 1:1 status of mom & baby, placing a hold on the baby, etc.  DAGO requested Janine review [employer] policy and speak with her manager re: placing 1:1 staff.  DAGO explained this writer has not been directed by [facility] staff to take any action towards pt or baby at this point, that the CPS team continues to investigate, will speak with the [facility], and notify the  & Birth Center  staff IF/WHEN [facility] CPS staff intervene.    Per discussion with Nelly Garcia, when pt delivers her baby, Birth Center staff are to contact CPS intake line (P: [phone] to inform of delivery & IF there are any concerns noted while pt has been hospitalized.   CM team also requests phone notification at time of birth, [phone].      Care Management team to follow for discharge plans.    DEVON Mendiola

## 2024-06-18 LAB
HGB BLD-MCNC: 8.8 G/DL (ref 11.7–15.7)
HOLD SPECIMEN: NORMAL

## 2024-06-18 PROCEDURE — 36415 COLL VENOUS BLD VENIPUNCTURE: CPT | Performed by: OBSTETRICS & GYNECOLOGY

## 2024-06-18 PROCEDURE — 120N000013 HC R&B IMCU

## 2024-06-18 PROCEDURE — 85018 HEMOGLOBIN: CPT | Performed by: OBSTETRICS & GYNECOLOGY

## 2024-06-18 ASSESSMENT — ACTIVITIES OF DAILY LIVING (ADL)
ADLS_ACUITY_SCORE: 21
ADLS_ACUITY_SCORE: 20
ADLS_ACUITY_SCORE: 21

## 2024-06-18 NOTE — PROGRESS NOTES
Both parents asleep.  Infant in mom's arms.  Woke mom to explain not safe to sleep with infant and placed infant in bassinet.  Mom back to sleep

## 2024-06-18 NOTE — MEDICATION SCRIBE - ADMISSION MEDICATION HISTORY
Medication Scribe Admission Medication History    Admission medication history is complete. The information provided in this note is only as accurate as the sources available at the time of the update.    Information Source(s): Patient and CareEverywhere/SureScripts via phone    Pertinent Information: Pt was never taking her Omeprazole and will continue to not take.     Changes made to PTA medication list:  Added: None  Deleted: Omeprazole 40 mg  Changed: None    Allergies reviewed with patient and updates made in EHR: yes    Medication History Completed By: Emigdio Laboy 6/18/2024 10:07 AM    PTA Med List   Medication Sig Last Dose    hydrocortisone, Perianal, (HYDROCORTISONE) 2.5 % cream Place rectally 2 times daily as needed for hemorrhoids Past Week at PRN    Prenatal Vit-Fe Fumarate-FA (PRENATAL MULTIVITAMIN  PLUS IRON) 27-1 MG TABS Take 1 tablet by mouth daily Past Week at am

## 2024-06-18 NOTE — PROGRESS NOTES
Care Management Note:    This note will be updated as the day progresses--    SW received notification from Nelly Velez, CPS Investigator,   Email:  [email or web address], Cellphone:  [phone], that she will be coming to the hospital today around 10 AM to notify the pt that her son will be placed on a 72 hour police custody hold.  (Nelly requests that staff do not inform the pt/family of this information)    DAGO spoke with and informed Charge RN, Michael.    DAGO spoke with and informed Pediatric NP, Beth Martinez.    DAGO received copy of 72 hour hold and sent to  Supervisor & Risk Management for review.  Approved by Risk Management.    DAGO review policy & notified [facility], spoke with Elias and informed of above.    DAGO to meet with Nelly at 10 am.    DEVON Mendiola

## 2024-06-18 NOTE — PROGRESS NOTES
Data: Vital signs within normal limits. Postpartum checks within normal limits - see flow record. Patient ambulating independently..   Patient Is performing self cares and Is able to care for infant with staff help. Positive attachment behaviors are observed with infant. Support persons are present.  Action:  Pain plan was discussed. Patient will request pain med when she is ready for it. Patient was not medicated during the shift for pain and cramping. See MAR.Patient education done about hand hygiene, formula feeding,  cares, postpartum cares, pain management/plan, and  safety. See flow record.

## 2024-06-18 NOTE — PROGRESS NOTES
Care Management Note:     8:30 AM:  SW received notification from Nelly Velez, CPS Investigator, Email:  [email or web address], Cellphone:  [phone], that she will be coming to the hospital today around 10 AM to notify the pt that her son will be placed on a 72 hour police custody hold.  (Nelly requests that staff do not inform the pt/family of this information)  Nelly requested discharge date of pt & son for purpose discharge planning.     DAGO received secure email copy of 72 hour hold and sent to  Supervisor & Risk Management for review.  Approved by Risk Management.       9:06 AM:  DAGO review policy & notified [facility] , spoke with Elias and informed of above.     DAGO planning to meet with Nelly around 10 am to discuss pt's case.  DAGO informed Nelly that pt will be medically stable for discharge per MD tomorrow.    10:30 AM:  Nelly with [facility] CPS & DAGO arrived to [facility], met to discuss case & DAGO learned that pt's son will be placed on a 72  hold.    Hold to begin 24 at 10:50 AM.    Hold to  on 2024, as hold do es not count during Holidays or weekends.    DAGO & Nelly spoke with BC Manager, Elvira, who informed that pt's son may not be medically stable for discharge for up to 72 hours, to ensure that staff have appropriate time to monitor for withdrawal status.  Elvira to speak with Pediatric CNP and confirm discharge date of patient & her son, and will notify DAGO.     Per discussion with Alonzo Villegas & in coordination with [employer] policy, the pt can:  1.    2. Pt is able to retain decision making of all medical needs for the baby.    DAGO & CHENG Villegas also informed by pt's MD Piper, that pt will remain hospitalized 1 more night and discharge tomorrow..    12:15 PM:   DAGO confirmed with Peds CNP that pt's baby should be medically stable to discharge in 72 hours of birth.  DAGO updated Nelly with CPS of new discharge date.    , [facility]  Manager, medical providers & Alonzo Villegas CPS to remain in discussion regarding discharge of pt and her son.      Ideally, birth mom to  discharge in the morning.  Baby to discharge in the afternoon into the care of CPS/(s).    Care Management team to follow for discharge plans.    DEVON Mendiola

## 2024-06-18 NOTE — PLAN OF CARE
Goal Outcome Evaluation:      Plan of Care Reviewed With: patient    Overall Patient Progress: improvingOverall Patient Progress: improving  Pt is ambulating well/tolerating food and fluids and voiding without difficulty.  Pt has denied pain.  Both parents participating in infant cares.

## 2024-06-18 NOTE — PROGRESS NOTES
[facility] OB/GYN Daily Postpartum Note    S: Ms. Wen is feeling well this morning. She denies any complaints. Her pain is well-controlled on oral pain medications. She tolerating a regular diet without nausea or vomiting. Ambulating without difficulty. Passing flatus and voiding. Lochia is decreasing.     O:   VS: Patient Vitals for the past 24 hrs:   BP Temp Temp src Pulse Resp SpO2   24 0912 139/67 97.7  F (36.5  C) Oral -- 18 --   24 0153 136/79 98.1  F (36.7  C) Oral 92 16 99 %   24 2048 132/75 98  F (36.7  C) Oral 105 16 --   24 1533 121/68 98.3  F (36.8  C) Oral -- 16 99 %   24 1518 127/68 -- -- -- -- --   24 1505 118/78 -- -- -- 20 --   24 1452 123/61 98.2  F (36.8  C) Oral -- 20 --   24 1435 132/62 -- -- -- 20 --   24 1420 131/66 -- -- -- 20 --   24 1405 118/78 -- -- -- 20 --   24 1350 134/66 -- -- -- 20 --   24 1230 115/74 98.1  F (36.7  C) -- -- 20 99 %   24 1100 -- 98  F (36.7  C) Oral -- -- --     General: resting in bed, in NAD  CV: Reg rate, warm and well perfused  Resp: breathing comfortably on room air   Abdomen: soft, appropriately tender, nondistended  Fundus firm at the umbilicus  Extremities: non-tender, non-edematous     Recent Labs   Lab 24  0647 24  0947   HGB 8.8* 9.1*       A/P: 30 yo P4, PPD#1 s/p   Complicated social situation: SW following, late PNC, meth/opioid use, UDS collected, IV removed   Acute on chronic blood loss anemia: Hgb 9.1 : 8.8, asymptomatic, will monitor closely given fundal height at umbilicus  Isolated elevated BP: BPs nl pp     Anticipate discharge PPD#2    Nusrat Piper MD, MD  [facility]   2024 10:53 AM

## 2024-06-19 VITALS
OXYGEN SATURATION: 98 % | RESPIRATION RATE: 18 BRPM | SYSTOLIC BLOOD PRESSURE: 130 MMHG | HEART RATE: 89 BPM | DIASTOLIC BLOOD PRESSURE: 72 MMHG | TEMPERATURE: 97.5 F

## 2024-06-19 PROBLEM — Z60.9 HIGH RISK SOCIAL SITUATION: Status: ACTIVE | Noted: 2024-06-19

## 2024-06-19 PROBLEM — D64.9 ACUTE ON CHRONIC ANEMIA: Status: ACTIVE | Noted: 2024-06-19

## 2024-06-19 PROBLEM — Z3A.39 39 WEEKS GESTATION OF PREGNANCY: Status: RESOLVED | Noted: 2024-06-16 | Resolved: 2024-06-19

## 2024-06-19 RX ORDER — FERROUS SULFATE 325(65) MG
325 TABLET ORAL
Qty: 60 TABLET | Refills: 0 | Status: SHIPPED | OUTPATIENT
Start: 2024-06-19

## 2024-06-19 RX ORDER — IBUPROFEN 800 MG/1
800 TABLET, FILM COATED ORAL EVERY 6 HOURS PRN
Qty: 30 TABLET | Refills: 1 | Status: SHIPPED | OUTPATIENT
Start: 2024-06-19

## 2024-06-19 ASSESSMENT — ACTIVITIES OF DAILY LIVING (ADL)
ADLS_ACUITY_SCORE: 20

## 2024-06-19 NOTE — PLAN OF CARE
Assumed care at 0700.    Patient and significant other sleeping heavily.   Baby brought back to the nursery.     At approximately 0800, patient left the unit to go smoke with significant other. Patient stated they would be ready for baby to be back in the room when they return.     Baby brought back to the patient's room at approximately 0816.   Dr. Lau at the bedside shortly after.     Plan: Patient is meeting discharge criteria and will be discharged today.

## 2024-06-19 NOTE — PROGRESS NOTES
[facility] DISCHARGE NOTE    Patient discharged to at 3:26 PM. Discharge instructions reviewed with patient, opportunity offered to ask questions. Prescriptions sent to patients preferred pharmacy. All belongings sent with patient.    Patient's mother will be picking her up after she gets off work at approximately 1600.    Jada Marino RN

## 2024-06-19 NOTE — PLAN OF CARE
PPD#2    Patient alert and up independently.   Postpartum checks are within normal limits.   Lac 1st degree healing well.   Vital signs stable.  Pain managed without medication.     Tolerating regular diet and denied any nausea.   Voiding spontaneously without issues.     Patient performing self cares.  Support person is present.      Patient has assisted in changing baby's diapers and bottle fed the baby once, but has otherwise not been holding baby.     Patient education done about postpartum cares, pain management/plan, rest, and discharge from hospital.

## 2024-06-19 NOTE — ASSESSMENT & PLAN NOTE
Chronic iron deficiency anemia in pregnancy with exacerbation from acute blood loss from delivery  Patient is asymptomatic   Home with iron therapy

## 2024-06-19 NOTE — ASSESSMENT & PLAN NOTE
PPD#1 s/p   Routine post partum care  Optimize pain management  Perineal care  Lactation support  Anticipate discharge home today

## 2024-06-19 NOTE — PROGRESS NOTES
Care Management Note:    Pt's son is on a 72 hour  hold.    Hold to begin 24 at 10:50 AM.    Hold to  on 2024, as hold do es not count during Holidays or weekends.     Pt and FOB informed on 24 that son would not discharge home with them, but would be placed by [facility] services at time he is medically stable for discharge.    Per discussion with Nelly [facility] & in coordination with [facility] policy, the pt can:  1.     2. Pt is able to retain decision making of all medical needs for the baby.    Pt is able to discharge when medically stable.      DEVON Reyes

## 2024-06-19 NOTE — DISCHARGE SUMMARY
[facility] Discharge Summary    Carol Wen MRN# [id number]   Age: 29 year old YOB: 1995     Date of Admission:  2024  Date of Discharge::  2024  Admitting Physician:  Dorinda Ramirez MD  Discharge Physician:  Areli Lau DO     Home clinic: [facility]          Admission Diagnoses:   39 weeks gestation of pregnancy   (spontaneous vaginal delivery)          Discharge Diagnosis:     Normal spontaneous vaginal delivery  Acute on chronic anemia  Complex social situation - baby on CPS hold          Procedures:     Procedure(s): , repair of 1st degree perineal laceration       No other procedures performed during this admission           Medications Prior to Admission:     Medications Prior to Admission   Medication Sig Dispense Refill Last Dose    [DISCONTINUED] hydrocortisone, Perianal, (HYDROCORTISONE) 2.5 % cream Place rectally 2 times daily as needed for hemorrhoids 30 g 0 Past Week at PRN    [DISCONTINUED] Prenatal Vit-Fe Fumarate-FA (PRENATAL MULTIVITAMIN  PLUS IRON) 27-1 MG TABS Take 1 tablet by mouth daily   Past Week at am             Discharge Medications:     Current Discharge Medication List        START taking these medications    Details   ferrous sulfate (FEROSUL) 325 (65 Fe) MG tablet Take 1 tablet (325 mg) by mouth daily (with breakfast)  Qty: 60 tablet, Refills: 0    Associated Diagnoses:  (spontaneous vaginal delivery)      ibuprofen (ADVIL/MOTRIN) 800 MG tablet Take 1 tablet (800 mg) by mouth every 6 hours as needed for other (cramping)  Qty: 30 tablet, Refills: 1    Associated Diagnoses:  (spontaneous vaginal delivery)           STOP taking these medications       hydrocortisone, Perianal, (HYDROCORTISONE) 2.5 % cream Comments:   Reason for Stopping:         Prenatal Vit-Fe Fumarate-FA (PRENATAL MULTIVITAMIN  PLUS IRON) 27-1 MG TABS Comments:   Reason for Stopping:                     Consultations:    No consultations were requested during this admission          Brief History of Labor:   Carol Wen is a 29 year old  presented at 39w1d who was admitted for IOL. She received pitocin and AROM and slowly reached complete dilation. Patient did not labor down. She pushed effectively and delivered a viable male infant with Apgars 9/9 over a 1st degree laceration. Weight is pending and skin-to-skin was performed. Delayed cord clamping was not performed. Cord was clamped and cut. Placenta delivered via active management and was noted to be intact with a three vessel cord.   1st degree laceration was repaired with 3-0 vicryl in standard fashion. Periurethral on right small and hemostatic with pressure. QBL 50ml. Sponge and needle counts correct. Mom and baby were transported to postpartum in stable condition.            Hospital Course:   The patient's hospital course was unremarkable.  On discharge, her pain was well controlled. Vaginal bleeding is similar to peak menstrual flow.  Voiding without difficulty.  Ambulating well and tolerating a normal diet.  No fever.  Infant is stable, on a CPS hold.  She was discharged on post-partum day #2.    Post-partum hemoglobin:   Hemoglobin   Date Value Ref Range Status   2024 8.8 (L) 11.7 - 15.7 g/dL Final   2006 13.1 11.7 - 15.7 g/dL Final             Discharge Instructions and Follow-Up:     Discharge diet: Regular   Discharge activity: Pelvic rest: abstain from intercourse and do not use tampons for 6 week(s)   Discharge follow-up: Follow up with OBGYN in 6 weeks   Wound care: Ice to area for comfort           Discharge Disposition:     Discharged to home      Attestation:  I have reviewed today's vital signs, notes, medications, labs and imaging.    Areli Lau DO

## 2024-06-19 NOTE — PROGRESS NOTES
[facility] OB/GYN Department    Post-Partum Progress Note: PPD #2    Name: Carol Wen  Date: 2024    Subjective:   Patient seen and examined.  No complaints.  Pain well controlled on PO medications.  Tolerating regular diet, without nausea or vomiting.  Ambulating and voiding without difficulty.  Lochia moderate.  Bottle feeding.     ROS:    General/Constitutional:  Denies chills or fever  Respiratory: Denies shortness of breath  Cardiovascular: Denies chest pain  Gastrointestinal:  +mild uterine cramping, no nausea or vomiting  Genitourinary: Denies difficulty urinating  Musculoskeletal: Denies peripheral edema      Objective:   No intake or output data in the 24 hours ending 24 0839    Patient Vitals for the past 24 hrs:   BP Temp Temp src Pulse Resp SpO2   24 0818 130/72 97.5  F (36.4  C) Oral -- -- 98 %   24 2310 129/74 97.8  F (36.6  C) Oral 89 -- 96 %   24 1555 -- -- -- -- -- 97 %   24 1554 136/80 -- -- 93 18 --   24 0912 139/67 97.7  F (36.5  C) Oral -- 18 --       Recent Labs   Lab 24  0647 24  0947   HGB 8.8* 9.1*       General appearance: well-hydrated, A&O x 3, no apparent distress  ENT: EOMI, sclera anicteric   Lungs: Equal expansion bilaterally, no accessory muscle use  Heart: No heaves or thrills. No peripheral varicosities  Constitutional: See vitals  Abdomen: Soft, non-distended, no rebound or rigidity   Uterus: Firm at umbilicus with non-tender fundus   Neurologic: CN II-XII grossly intact, no lateralizing defects, no gross movement abnormalities  Extremities: non pitting pedal edema, no calf tenderness    Assessment and Plan:    Acute on chronic anemia  Chronic iron deficiency anemia in pregnancy with exacerbation from acute blood loss from delivery  Patient is asymptomatic   Home with iron therapy     (spontaneous vaginal delivery)  PPD#1 s/p   Routine post partum care  Optimize pain management  Perineal care  Lactation  support  Anticipate discharge home today      High risk social situation  Patient has history of drug use in pregnancy, does not have custody of her other children. CPS case initiated, baby is on a hold. Patient does have visitation rights.     Areli Lau,

## 2024-06-19 NOTE — ASSESSMENT & PLAN NOTE
Patient has history of drug use in pregnancy, does not have custody of her other children. CPS case initiated, baby is on a hold. Patient does have visitation rights.

## 2024-06-19 NOTE — PLAN OF CARE
Data: Vital signs within normal limits. Postpartum checks within normal limits - see flow record. Patient  is tolerating po intake. Patient is able to empty bladder independently. . Patient ambulating independently..   No apparent signs of infection. Lac 1st degree healing well. Patient is performing self cares and is able to care for infant. Positive attachment behaviors are observed with infant. Support persons are present.  Action:  Pain plan was discussed. Patient will request pain med when she is ready for it. Patient was not medicated during the shift for pain. See MAR.Patient education done about formula feeding,  cares, and postpartum cares. See flow record.  Response:   Patient reassessed within 1 hour after each medication for pain. Patient stated that pain had improved. Patient stated that she was comfortable. .   Plan: Anticipate discharge on 2024.    Ann Tolbert RN

## 2024-06-19 NOTE — DISCHARGE INSTRUCTIONS
Warning Signs after Having a Baby    Keep this paper on your fridge or somewhere else where you can see it.    Call your provider if you have any of these symptoms up to 12 weeks after having your baby.    Thoughts of hurting yourself or your baby  Pain in your chest or trouble breathing  Severe headache not helped by pain medicine  Eyesight concerns (blurry vision, seeing spots or flashes of light, other changes to eyesight)  Fainting, shaking or other signs of a seizure    Call 9-1-1 if you feel that it is an emergency.     The symptoms below can happen to anyone after giving birth. They can be very serious. Call your provider if you have any of these warning signs.    My provider s phone number: _______________________    Losing too much blood (hemorrhage)    Call your provider if you soak through a pad in less than an hour or pass blood clots bigger than a golf ball. These may be signs that you are bleeding too much.    Blood clots in the legs or lungs    After you give birth, your body naturally clots its blood to help prevent blood loss. Sometimes this increased clotting can happen in other areas of the body, like the legs or lungs. This can block your blood flow and be very dangerous.     Call your provider if you:  Have a red, swollen spot on the back of your leg that is warm or painful when you touch it.   Are coughing up blood.     Infection    Call your provider if you have any of these symptoms:  Fever of 100.4 F (38 C) or higher.  Pain or redness around your stitches if you had an incision.   Any yellow, white, or green fluid coming from places where you had stitches or surgery.    Mood Problems (postpartum depression)    Many people feel sad or have mood changes after having a baby. But for some people, these mood swings are worse.     Call your provider right away if you feel so anxious or nervous that you can't care for yourself or your baby.    Preeclampsia (high blood pressure)    Even if you  didn't have high blood pressure when you were pregnant, you are at risk for the high blood pressure disease called preeclampsia. This risk can last up to 12 weeks after giving birth.     Call your provider if you have:   Pain on your right side under your rib cage  Sudden swelling in the hands and face    Remember: You know your body. If something doesn't feel right, get medical help.     For informational purposes only. Not to replace the advice of your health care provider. Copyright 2020 [facility]. All rights reserved. Clinically reviewed by Kelsie Rodarte, RNC-OB, MSN. [employer] 702850 - Rev 02/23.    Vaginal Childbirth: Care Instructions  Overview     Vaginal birth means delivering a baby through the birth canal (vagina). During labor, the uterus tightens (contracts) regularly to thin and open the cervix and to push the baby out through the birth canal.  Your body will slowly heal in the next few weeks. It's easy to get too tired and overwhelmed during the first weeks after your baby is born. Changes in your hormones can shift your mood without warning. You may find it hard to meet the extra demands on your energy and time. Take it easy on yourself.  Follow-up care is a key part of your treatment and safety. Be sure to make and go to all appointments, and call your doctor if you are having problems. It's also a good idea to know your test results and keep a list of the medicines you take.  How can you care for yourself at home?  Vaginal bleeding and cramps  After delivery, you will have a bloody discharge from your vagina. This will turn pink within a week and then white or yellow after about 10 days. It may last for 2 to 4 weeks or longer, until the uterus has healed. Use sanitary pads until you stop bleeding. Using pads makes it easier to monitor your bleeding.  Don't worry if you pass some blood clots, as long as they are smaller than a golf ball. If you have a tear or stitches in your  vaginal area, change the pad at least every 4 hours. This will help prevent soreness and infection.  You may have cramps for the first few days after childbirth. These are normal and occur as the uterus shrinks to normal size. Take an over-the-counter pain medicine, such as acetaminophen (Tylenol), ibuprofen (Advil, Motrin), or naproxen (Aleve), for cramps. Read and follow all instructions on the label. Do not take aspirin, because it can cause more bleeding.  Do not take two or more pain medicines at the same time unless the doctor told you to. Many pain medicines have acetaminophen, which is Tylenol. Too much acetaminophen (Tylenol) can be harmful.  Stitches  If you have stitches, they will dissolve on their own and don't need to be removed. Follow your doctor's instructions for cleaning the stitched area.  Put ice or a cold pack on your painful area for 10 to 20 minutes at a time, several times a day, for the first few days. Put a thin cloth between the ice and your skin.  Sit in a few inches of warm water (sitz bath) 3 times a day and after bowel movements. The warm water helps with pain and itching. If you don't have a tub, a warm shower might help.  Breast fullness  Your breasts may overfill (engorge) in the first few days after delivery. To help milk flow and to relieve pain, warm your breasts in the shower or by using warm, moist towels before nursing.  If you aren't nursing, don't put warmth on your breasts or touch your breasts. Wear a bra that fits well and use ice until the fullness goes away. This usually takes 2 to 3 days.  Put ice or a cold pack on your breast after nursing to reduce swelling and pain. Put a thin cloth between the ice and your skin.  Activity  Eat a balanced diet. Don't try to lose weight by cutting calories. Keep taking your prenatal vitamins, or take a multivitamin.  Get as much rest as you can. Try to take naps when your baby sleeps during the day.  Get some exercise every day. But  don't do any heavy exercise until your doctor says it is okay.  Wait until you are healed (about 4 to 6 weeks) before you have sexual intercourse. Your doctor will tell you when it is okay to have sex.  If you don't want to get pregnant, talk to your doctor about birth control. You can get pregnant even before your period returns. Also, you can get pregnant while you are breastfeeding.  Mental health  It's normal to have some sadness, anxiety, sleeplessness, and mood swings after you go home. If you feel upset or hopeless for more than a few days or are having trouble doing the things you need to do, talk to your doctor.  Constipation and hemorrhoids  Drink plenty of fluids. If you have kidney, heart, or liver disease and have to limit fluids, talk with your doctor before you increase the amount of fluids you drink.  Eat plenty of fiber each day. Have a bran muffin or bran cereal for breakfast. Try eating a piece of fruit for a mid-afternoon snack.  For painful, itchy hemorrhoids, put ice or a cold pack on the area several times a day for 10 minutes at a time. Follow this by putting a warm compress on the area for another 10 to 20 minutes or by sitting in a shallow, warm bath.  When should you call for help?  Share this information with your partner, family, or a friend. They can help you watch for warning signs.  Call 911  anytime you think you may need emergency care. For example, call if:    You feel you cannot stop from hurting yourself, your baby, or someone else.     You passed out (lost consciousness).     You have chest pain, are short of breath, or cough up blood.     You have a seizure.   Where to get help 24 hours a day, 7 days a week   If you or someone you know talks about suicide, self-harm, a mental health crisis, a substance use crisis, or any other kind of emotional distress, get help right away. You can:    Call the Suicide and Crisis Lifeline at 228.     Call 3-014-243-TALK (1-536.821.1903).      Text HOME to 828091 to access the Crisis Text Line.   Consider saving these numbers in your phone.  Go to [employer] for more information or to chat online.  Call your doctor or midwife now or seek immediate medical care if:    You have signs of hemorrhage (too much bleeding), such as:  Heavy vaginal bleeding. This means that you are soaking through one or more pads in an hour. Or you pass blood clots bigger than an egg.  Feeling dizzy or lightheaded, or you feel like you may faint.  Feeling so tired or weak that you cannot do your usual activities.  A fast or irregular heartbeat.  New or worse belly pain.     You have signs of infection, such as:  A fever.  Increased pain, swelling, warmth, or redness from an incision or wound.  Frequent or painful urination or blood in your urine.  Vaginal discharge that smells bad.  New or worse belly pain.     You have symptoms of a blood clot in your leg (called a deep vein thrombosis), such as:  Pain in the calf, back of the knee, thigh, or groin.  Swelling in the leg or groin.  A color change on the leg or groin. The skin may be reddish or purplish, depending on your usual skin color.     You have signs of preeclampsia, such as:  Sudden swelling of your face, hands, or feet.  New vision problems (such as dimness, blurring, or seeing spots).  A severe headache.     You have signs of heart failure, such as:  New or increased shortness of breath.  New or worse swelling in your legs, ankles, or feet.  Sudden weight gain, such as more than 2 to 3 pounds in a day or 5 pounds in a week.  Feeling so tired or weak that you cannot do your usual activities.     You had spinal or epidural pain relief and have:  New or worse back pain.  Increased pain, swelling, warmth, or redness at the injection site.  Tingling, weakness, or numbness in your legs or groin.   Watch closely for changes in your health, and be sure to contact your doctor or midwife if:    Your vaginal bleeding isn't  "decreasing.     You feel sad, anxious, or hopeless for more than a few days.     You are having problems with your breasts or breastfeeding.   Where can you learn more?  Go to https://www.Skood[employer].net/patiented  Enter Q237 in the search box to learn more about \"Vaginal Childbirth: Care Instructions.\"  Current as of: July 10, 2023               Content Version: 14.0    [id number] [employer].   Care instructions adapted under license by your healthcare professional. If you have questions about a medical condition or this instruction, always ask your healthcare professional. [employer] disclaims any warranty or liability for your use of this information.      "

## 2024-06-20 ENCOUNTER — TELEPHONE (OUTPATIENT)
Dept: OBGYN | Facility: CLINIC | Age: 29
End: 2024-06-20
Payer: COMMERCIAL

## 2024-06-20 ENCOUNTER — PATIENT OUTREACH (OUTPATIENT)
Dept: CARE COORDINATION | Facility: CLINIC | Age: 29
End: 2024-06-20
Payer: COMMERCIAL

## 2024-06-20 NOTE — PROGRESS NOTES
Clinic Care Coordination Contact  Transitions of Care Outreach  Chief Complaint   Patient presents with    Clinic Care Coordination - Post Hospital       Most Recent Admission Date: 2024   Most Recent Admission Diagnosis:      Most Recent Discharge Date: 2024   Most Recent Discharge Diagnosis: Prenatal care, subsequent pregnancy in third trimester - Z34.83   (spontaneous vaginal delivery) - O80     Transitions of Care Assessment    Discharge Assessment  How are you doing now that you are home?: Breasts are engorged, painful  How are your symptoms? (Red Flag symptoms escalate to triage hotline per guidelines): Unchanged  Do you know how to contact your clinic care team if you have future questions or changes to your health status? : Yes  Does the patient have their discharge instructions? : Yes  Does the patient have questions regarding their discharge instructions? : No  Were you started on any new medications or were there changes to any of your previous medications? : Yes  Does the patient have all of their medications?: Yes  Do you have questions regarding any of your medications? : No  Do you have all of your needed medical supplies or equipment (DME)?  (i.e. oxygen tank, CPAP, cane, etc.): Yes    Pt was hospitalizled at [facility] 24 to 24 for , repair of 1st degree perineal laceration. Child placed in CPS police hold, expiring 24. Plan for foster care.     START taking:  ferrous sulfate (FEROSUL)  ibuprofen (ADVIL/MOTRIN)  STOP taking:  hydrocortisone (Perianal) 2.5 % cream (hydrocortisone)  prenatal multivitamin plus iron 27-1 MG Tabs    Breast pump DME  Other instructions in AVS    Follow Up  Follow up with provider in 6 weeks for post-delivery check (not scheduled)     DAGO WELSH made call to # on file. Mother answered, gave pt # [phone], another option [phone]. Updated chart.     DAGO WELSH made call to pt. Pt wants heat/cold packs for engorged breasts.  Painful. DAGO CC to send msg to clinic about any options or Rx. After this call, saw pt called clinic - see separate encounter.     Would like assistance making 6 week appt. DAGO WELSH to send msg to clinic scheduler. Later noted appt made for 7/30/24.     Follow up Plan     Discharge Follow-Up  Discharge follow up appointment scheduled in alignment with recommended follow up timeframe or Transitions of Risk Category? (Low = within 30 days; Moderate= within 14 days; High= within 7 days): No  Patient's follow up appointment not scheduled: Patient accepted scheduling support. Appt scheduled/requested per protocol.    No future appointments. DAGO WELSH sent message to OB AFR to outreach to pt to make appt - they will call pt.     Outpatient Plan as outlined on AVS reviewed with patient.    For any urgent concerns, please contact our 24 hour nurse triage line: [phone] ([phone])       DEVON Fay

## 2024-06-20 NOTE — TELEPHONE ENCOUNTER
[facility] Call [facility]    Phone Message    May a detailed message be left on voicemail: yes      Reason for Call: Pt stated they were going to talk to our providers about getting some heating pads of ours she could have. Please call pt to discuss Thank you    Action Taken: Message routed to:  Other: JOANNE PORTILLO    Travel Screening: Not Applicable

## 2024-06-20 NOTE — TELEPHONE ENCOUNTER
Return call to pt.  Unable to reach.  Left message for pt to return call to clinic.    Margot Harris   Ob/Gyn Clinic  RN

## 2024-06-21 NOTE — TELEPHONE ENCOUNTER
Message left to return call. Rasheeda Hernandez RN     Was the condition present on admission? If so, please document in the chart that “(the condition) was present on admission.”

## 2024-06-25 NOTE — TELEPHONE ENCOUNTER
Unable to reach patient.   Will close encounter and re-open if patient returns call.    Margot RN   Ob/Gyn Clinic

## 2024-06-28 ENCOUNTER — PATIENT OUTREACH (OUTPATIENT)
Dept: CARE COORDINATION | Facility: CLINIC | Age: 29
End: 2024-06-28
Payer: COMMERCIAL

## 2024-07-15 ENCOUNTER — PATIENT OUTREACH (OUTPATIENT)
Dept: CARE COORDINATION | Facility: CLINIC | Age: 29
End: 2024-07-15
Payer: COMMERCIAL

## 2024-07-15 NOTE — LETTER
[facility] CARE COORDINATION  [facility]  [address] [place] [address]    July 15, 2024    Carol Wen  [address]  [place] [address]    Dear Carol,    Your Care Team congratulates you on your journey to maintain wellness. This document will help guide you on your journey to maintain a healthy lifestyle.  You can use this to help you overcome any barriers you may encounter.  If you should have any questions or concerns, you can contact the members of your Care Team or contact your Primary Care Clinic for assistance.     Health Maintenance  Health Maintenance Reviewed:    Health Maintenance Due   Topic Date Due    NICOTINE/TOBACCO CESSATION COUNSELING Q 1 YR  Never done    YEARLY PREVENTIVE VISIT  Never done    ADVANCE CARE PLANNING  Never done    Pneumococcal Vaccine: Pediatrics (0 to 5 Years) and At-Risk Patients (6 to 64 Years) (1 of 2 - PCV) Never done    HEPATITIS A IMMUNIZATION (1 of 2 - Risk 2-dose series) Never done    DTAP/TDAP/TD IMMUNIZATION (7 - Td or Tdap) 08/16/2016    COVID-19 Vaccine (1 - 2023-24 season) Never done     My Access Plan  Medical Emergency 911   Primary Clinic Line [facility] - [phone]   24 Hour Appointment Line [phone] or  [phone] ([phone]) (toll-free)   24 Hour Nurse Line [phone] (toll-free)   Preferred Urgent Care     Preferred Hospital     Preferred Pharmacy [facility] [place], [place] - [address] [facility]     Behavioral Health Crisis Line The National Suicide Prevention Lifeline at 1-923.589.2865 or 911     My Care Team Members  Patient Care Team         Relationship Specialty Notifications Start End    [facility] PCP - General   4/15/24     Merged    Phone: [phone] Fax: [phone] [address] [facility] [address]    Areli Lau DO Physician OB/Gyn  4/5/24     Phone: [phone] Fax: [phone] [address] [facility]  [address]    No Ref-Primary, Physician    4/15/24     Merged    Fax: [phone]         Dorinda Ramirez MD MD OB/Gyn  4/16/24     Phone: [phone] Fax: [phone] [address] [facility] [address]    Dhara Brooke, CHAVNI Community Health Worker Primary Care - CC Admissions 4/22/24     Leticia Pitt LSW Lead Care Coordinator Primary Care - CC Admissions 4/23/24     Phone: [phone]          [facility] [facility] [address]    Dorinda Ramirez MD Assigned OBGYN Provider   6/23/24     Phone: [phone] Fax: [phone] [address] [facility] [address]              Advance Care Plans/Directives Type:      We notice that you do not have an Advance Directive on file. Upon completion of your Health Care Directive, please bring a copy with you to your next office visit.    It has been your Clinic Care Team's pleasure to work with you on accomplishing your goals.    Regards,    Your Clinic Care Team

## 2024-07-15 NOTE — PROGRESS NOTES
Clinic Care Coordination Contact    Assessment: CHW Care Coordinator contacted patient for 2 month follow up. Patient has continued to follow the plan of care and assessment is negative for any new needs or concerns.    Enrollment status: Graduated     Plan: No further outreaches at this time. Patient will continue to follow the plan of care. If new needs arise a new Care Coordination referral may be placed. Letter generated.     Leticia Pitt [facility]   Social Work Primary Care Clinic Care Coordinator   [facility]  [phone]  [email or web address]

## 2024-07-15 NOTE — PROGRESS NOTES
Clinic Care Coordination Contact  Community Health Worker Follow Up    Care Gaps:     Health Maintenance Due   Topic Date Due    NICOTINE/TOBACCO CESSATION COUNSELING Q 1 YR  Never done    YEARLY PREVENTIVE VISIT  Never done    ADVANCE CARE PLANNING  Never done    Pneumococcal Vaccine: Pediatrics (0 to 5 Years) and At-Risk Patients (6 to 64 Years) (1 of 2 - PCV) Never done    HEPATITIS A IMMUNIZATION (1 of 2 - Risk 2-dose series) Never done    DTAP/TDAP/TD IMMUNIZATION (7 - Td or Tdap) 08/16/2016    COVID-19 Vaccine (1 - 2023-24 season) Never done         Care Plan:   Care Plan: General       Problem: Baby resources       Goal: Baby resources  Completed 5/16/2024      Start Date: 4/24/2024 Expected End Date: 8/1/2024    This Visit's Progress: 100%    Priority: High    Note:     Barriers: Access, just found out she is pregnant at ~30 weeks  Strengths: Has MA now, motivated   Patient expressed understanding of goal: Yes    Action steps to achieve this goal:  1. I will call local baby resources for supplies. (Completed)    2. I will call WIC to get enrolled. (Completed)  3. I will work with care coordination team as needed.                                Intervention and Education during outreach:   CHW spoke to patient's mom, Vielka. Vielka states that the patient is doing good with baby supplies and WIC. Vielka states that they don't have any other questions or concerns for CC at this time. Vielka states that it is okay to be graduated from the CC program.    CHW Plan: CHW will route patient to [facility] to review for graduation.    Dhara Brooke CHW, B.A. [facility] Care Coordination  [facility]:   [facility]  [phone]

## 2024-07-30 PROBLEM — Z36.89 ENCOUNTER FOR TRIAGE IN PREGNANT PATIENT: Status: RESOLVED | Noted: 2024-05-29 | Resolved: 2024-07-30

## 2024-07-30 PROBLEM — Z34.80 PRENATAL CARE, SUBSEQUENT PREGNANCY: Status: RESOLVED | Noted: 2024-04-18 | Resolved: 2024-07-30

## 2024-08-13 ENCOUNTER — PRENATAL OFFICE VISIT (OUTPATIENT)
Dept: OBGYN | Facility: CLINIC | Age: 29
End: 2024-08-13
Payer: COMMERCIAL

## 2024-08-13 VITALS
DIASTOLIC BLOOD PRESSURE: 59 MMHG | RESPIRATION RATE: 18 BRPM | BODY MASS INDEX: 40.12 KG/M2 | TEMPERATURE: 98.6 F | SYSTOLIC BLOOD PRESSURE: 121 MMHG | HEIGHT: 62 IN | WEIGHT: 218 LBS | HEART RATE: 89 BPM

## 2024-08-13 DIAGNOSIS — Z30.011 ENCOUNTER FOR INITIAL PRESCRIPTION OF CONTRACEPTIVE PILLS: ICD-10-CM

## 2024-08-13 LAB
HCG UR QL: NEGATIVE
INTERNAL QC OK POCT: NORMAL
POCT KIT EXPIRATION DATE: NORMAL
POCT KIT LOT NUMBER: NORMAL

## 2024-08-13 PROCEDURE — 99207 PR POST PARTUM EXAM: CPT | Performed by: STUDENT IN AN ORGANIZED HEALTH CARE EDUCATION/TRAINING PROGRAM

## 2024-08-13 PROCEDURE — 81025 URINE PREGNANCY TEST: CPT | Performed by: STUDENT IN AN ORGANIZED HEALTH CARE EDUCATION/TRAINING PROGRAM

## 2024-08-13 RX ORDER — DROSPIRENONE AND ETHINYL ESTRADIOL 0.02-3(28)
1 KIT ORAL DAILY
Qty: 84 TABLET | Refills: 3 | Status: SHIPPED | OUTPATIENT
Start: 2024-08-13

## 2024-08-13 ASSESSMENT — PATIENT HEALTH QUESTIONNAIRE - PHQ9
SUM OF ALL RESPONSES TO PHQ QUESTIONS 1-9: 1
SUM OF ALL RESPONSES TO PHQ QUESTIONS 1-9: 1
10. IF YOU CHECKED OFF ANY PROBLEMS, HOW DIFFICULT HAVE THESE PROBLEMS MADE IT FOR YOU TO DO YOUR WORK, TAKE CARE OF THINGS AT HOME, OR GET ALONG WITH OTHER PEOPLE: NOT DIFFICULT AT ALL

## 2024-08-13 ASSESSMENT — ANXIETY QUESTIONNAIRES
IF YOU CHECKED OFF ANY PROBLEMS ON THIS QUESTIONNAIRE, HOW DIFFICULT HAVE THESE PROBLEMS MADE IT FOR YOU TO DO YOUR WORK, TAKE CARE OF THINGS AT HOME, OR GET ALONG WITH OTHER PEOPLE: NOT DIFFICULT AT ALL
GAD7 TOTAL SCORE: 0
7. FEELING AFRAID AS IF SOMETHING AWFUL MIGHT HAPPEN: NOT AT ALL
1. FEELING NERVOUS, ANXIOUS, OR ON EDGE: NOT AT ALL
8. IF YOU CHECKED OFF ANY PROBLEMS, HOW DIFFICULT HAVE THESE MADE IT FOR YOU TO DO YOUR WORK, TAKE CARE OF THINGS AT HOME, OR GET ALONG WITH OTHER PEOPLE?: NOT DIFFICULT AT ALL
5. BEING SO RESTLESS THAT IT IS HARD TO SIT STILL: NOT AT ALL
2. NOT BEING ABLE TO STOP OR CONTROL WORRYING: NOT AT ALL
3. WORRYING TOO MUCH ABOUT DIFFERENT THINGS: NOT AT ALL
GAD7 TOTAL SCORE: 0
6. BECOMING EASILY ANNOYED OR IRRITABLE: NOT AT ALL
7. FEELING AFRAID AS IF SOMETHING AWFUL MIGHT HAPPEN: NOT AT ALL
4. TROUBLE RELAXING: NOT AT ALL
GAD7 TOTAL SCORE: 0

## 2024-08-13 NOTE — NURSING NOTE
"Initial /59 (BP Location: Right arm, Patient Position: Chair, Cuff Size: Adult Large)   Pulse 89   Temp 98.6  F (37  C) (Tympanic)   Resp 18   Ht 1.562 m (5' 1.5\")   Wt 98.9 kg (218 lb)   Breastfeeding No   BMI 40.52 kg/m   Estimated body mass index is 40.52 kg/m  as calculated from the following:    Height as of this encounter: 1.562 m (5' 1.5\").    Weight as of this encounter: 98.9 kg (218 lb). .    "

## 2024-08-13 NOTE — PROGRESS NOTES
"[facility]  Post Partum Office Visit    Assessment and Plan:   Carol Wen, 29 year old  s/p  on 2024 at 39w1d, presents for a 6 week post-partum visit. 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 Her delivery was uncomplicated.    Postpartum care  - Appropriate post partum recovery.  - Contraction: UPT neg. No contraindications for combined oral birth control pills identified. Patient only had several elevate BPs throughout her prenatal care and intrapartum course.  drospirenone-ethinyl estradiol (EVARISTO) 3-0.02 MG tablet          Take 1 tablet by mouth daily, Disp-84 tablet, R-3, E-Prescribe   -Last pap smear 2024 NILM w/ neg HPV    Misty Jay MD  Obstetrics and Gynecology  [facility]   2024     ---------------------------------------------------------------------------------------------------------------------------  Subjective: Vaginal bleeding has stopped. Has not resumed sexual intercourse. Interested in birth control pills for birth control. Denied any urinary or GI concerns. Not breast feeding. Denied breast concerns. Denied mood concerns.      Denied personal history of migraine w/ aura, liver/kidney/cardiac disease, personal or family history of DVT/PE. Tolerated oral birth control pills in the past without problems. Does feel like she can remember to take a medication everyday.    Objective:   Vitals:    24 1104   BP: 121/59   BP Location: Right arm   Patient Position: Chair   Cuff Size: Adult Large   Pulse: 89   Resp: 18   Temp: 98.6  F (37  C)   TempSrc: Tympanic   Weight: 98.9 kg (218 lb)   Height: 1.562 m (5' 1.5\")      Estimated body mass index is 40.52 kg/m  as calculated from the following:    Height as of this encounter: 1.562 m (5' 1.5\").    Weight as of this " encounter: 98.9 kg (218 lb).    General appearance: well-hydrated, A&O x 3, no apparent distress  Lungs: Equal expansion bilaterally, no accessory muscle use  Heart: No heaves or thrills. No peripheral varicosities  Abdomen: Soft, non-tender, non-distended. No rebound, rigidity, or guarding.      Answers submitted by the patient for this visit:  Patient Health Questionnaire (Submitted on 8/13/2024)  If you checked off any problems, how difficult have these problems made it for you to do your work, take care of things at home, or get along with other people?: Not difficult at all  PHQ9 TOTAL SCORE: 1  KALLIE-7 (Submitted on 8/13/2024)  KALLIE 7 TOTAL SCORE: 0

## 2024-11-06 ENCOUNTER — PATIENT OUTREACH (OUTPATIENT)
Dept: CARE COORDINATION | Facility: CLINIC | Age: 29
End: 2024-11-06

## 2024-11-06 ENCOUNTER — OFFICE VISIT (OUTPATIENT)
Dept: FAMILY MEDICINE | Facility: CLINIC | Age: 29
End: 2024-11-06
Payer: COMMERCIAL

## 2024-11-06 VITALS
BODY MASS INDEX: 42.58 KG/M2 | RESPIRATION RATE: 20 BRPM | HEIGHT: 62 IN | OXYGEN SATURATION: 98 % | DIASTOLIC BLOOD PRESSURE: 68 MMHG | HEART RATE: 87 BPM | WEIGHT: 231.4 LBS | TEMPERATURE: 98 F | SYSTOLIC BLOOD PRESSURE: 102 MMHG

## 2024-11-06 DIAGNOSIS — J45.20 MILD INTERMITTENT ASTHMA WITHOUT COMPLICATION: ICD-10-CM

## 2024-11-06 DIAGNOSIS — Z30.011 ENCOUNTER FOR INITIAL PRESCRIPTION OF CONTRACEPTIVE PILLS: ICD-10-CM

## 2024-11-06 DIAGNOSIS — Z87.898 HISTORY OF SUBSTANCE USE DISORDER: ICD-10-CM

## 2024-11-06 DIAGNOSIS — Z76.89 ENCOUNTER TO ESTABLISH CARE: Primary | ICD-10-CM

## 2024-11-06 DIAGNOSIS — Z60.9 HIGH RISK SOCIAL SITUATION: ICD-10-CM

## 2024-11-06 PROBLEM — J45.909 ASTHMA: Status: ACTIVE | Noted: 2018-01-07

## 2024-11-06 PROBLEM — E66.9 OBESITY WITH BODY MASS INDEX 30 OR GREATER: Status: ACTIVE | Noted: 2021-11-05

## 2024-11-06 LAB
AMPHETAMINES UR QL: NOT DETECTED
BARBITURATES UR QL SCN: NOT DETECTED
BENZODIAZ UR QL SCN: NOT DETECTED
BUPRENORPHINE UR QL: NOT DETECTED
CANNABINOIDS UR QL: NOT DETECTED
COCAINE UR QL SCN: NOT DETECTED
D-METHAMPHET UR QL: NOT DETECTED
HCG UR QL: NEGATIVE
METHADONE UR QL SCN: NOT DETECTED
OPIATES UR QL SCN: NOT DETECTED
OXYCODONE UR QL SCN: NOT DETECTED
PCP UR QL SCN: NOT DETECTED
TRICYCLICS UR QL SCN: NOT DETECTED

## 2024-11-06 PROCEDURE — 81025 URINE PREGNANCY TEST: CPT

## 2024-11-06 PROCEDURE — 99204 OFFICE O/P NEW MOD 45 MIN: CPT

## 2024-11-06 PROCEDURE — G2211 COMPLEX E/M VISIT ADD ON: HCPCS

## 2024-11-06 RX ORDER — DROSPIRENONE AND ETHINYL ESTRADIOL 0.02-3(28)
1 KIT ORAL DAILY
Qty: 84 TABLET | Refills: 3 | Status: SHIPPED | OUTPATIENT
Start: 2024-11-06

## 2024-11-06 RX ORDER — ALBUTEROL SULFATE 90 UG/1
2 INHALANT RESPIRATORY (INHALATION) EVERY 6 HOURS PRN
Qty: 18 G | Refills: 0 | Status: SHIPPED | OUTPATIENT
Start: 2024-11-06

## 2024-11-06 SDOH — SOCIAL STABILITY - SOCIAL INSECURITY: PROBLEM RELATED TO SOCIAL ENVIRONMENT, UNSPECIFIED: Z60.9

## 2024-11-06 NOTE — LETTER
2024    Carol Wen   1995        To Whom it May Concern;    Please excuse Carol Wen from work/school for a healthcare visit on 2024.    Sincerely,        KAN Lozano CNP

## 2024-11-06 NOTE — PROGRESS NOTES
Patient identified using two patient identifiers.  Ear exam showing wax occlusion completed by provider.  Solution: warm water was placed in the right ear(s) via irrigation tool: elephant ear.  Bryan Casas MA on 11/6/2024 at 3:11 PM

## 2024-11-06 NOTE — LETTER
November 6, 2024      Carol Wen  [address]   [place] [address]        To Whom It May Concern,     Patient was seen by myself today 11/6/24 to establish care.       Sincerely,        KAN Lozano CNP

## 2024-11-06 NOTE — LETTER
[facility] CARE COORDINATION  [address] [address]     November 6, 2024    Carol Wen  [address]   [place] [address]      Dear Carol,    I am a clinic care coordinator who works with [facility] with the [facility]. I wanted to introduce myself and provide you with my contact information for you to be able to call me with any questions or concerns. Below is a description of clinic care coordination and how I can further assist you.       The clinic care coordination team is made up of a registered nurse, , financial resource worker and community health worker who understand the health care system. The goal of clinic care coordination is to help you manage your health and improve access to the health care system. Our team works alongside your provider to assist you in determining your health and social needs. We can help you obtain health care and community resources, providing you with necessary information and education. We can work with you through any barriers and develop a care plan that helps coordinate and strengthen the communication between you and your care team.  Our services are voluntary and are offered without charge to you personally.    Please feel free to contact me with any questions or concerns regarding care coordination and what we can offer.      We are focused on providing you with the highest-quality healthcare experience possible.    Sincerely,     Samra Varma, RIOW, MSW Clinic   [facility]  Care Coordination  [facility]   [email or web address]  Office: [phone]  Employed by [facility]

## 2024-11-06 NOTE — PROGRESS NOTES
"  Assessment & Plan     Encounter to establish care  29 year old female currently in intensive treatment for substance abuse. She is otherwise healthy other than asthma.     Encounter for initial prescription of contraceptive pills  Urine pregnancy normal. Declines history of blood clots of migraines with aura. Has taken Evaristo before, medication filled. Instructed patient to use back up contraceptive for 7 days after initiation.   - drospirenone-ethinyl estradiol (EVARISTO) 3-0.02 MG tablet; Take 1 tablet by mouth daily.  - HCG qualitative urine; Future  - HCG qualitative urine    Mild intermittent asthma without complication  Patient reports symptoms are stable. Albuterol inhaler refilled.   - albuterol (PROAIR HFA/PROVENTIL HFA/VENTOLIN HFA) 108 (90 Base) MCG/ACT inhaler; Inhale 2 puffs into the lungs every 6 hours as needed for shortness of breath, wheezing or cough.    High risk social situation  Working with a [facility]  but states they are only providing patient with resources in [place]. She would like to get away from the [facility] due to history of substance abuse. Agreeable to primary care coordination.   - Primary Care - Care Coordination Referral; Future    History of substance use disorder  - Primary Care - Care Coordination Referral; Future    The longitudinal plan of care for the diagnosis(es)/condition(s) as documented were addressed during this visit. Due to the added complexity in care, I will continue to support Carol in the subsequent management and with ongoing continuity of care.    Nicotine/Tobacco Cessation  She reports that she has been smoking cigarettes. She has never been exposed to tobacco smoke. She has never used smokeless tobacco.      BMI  Estimated body mass index is 43.01 kg/m  as calculated from the following:    Height as of this encounter: 1.562 m (5' 1.5\").    Weight as of this encounter: 105 kg (231 lb 6.4 oz).             Subjective   Carol is a 29 year old, " "presenting for the following health issues:  Establish Care      11/6/2024     1:41 PM   Additional Questions   Roomed by leia hawley         11/6/2024   Forms   Any forms needing to be completed Yes        History of Present Illness       Reason for visit:  Set up a primary provider   She is taking medications regularly.     7 months clean. In treatment center and intensive outpatient in [place].   Migraines without aura, only gets one every few months.   Has a 5 month old.                 Objective    /68 (BP Location: Left arm, Patient Position: Sitting, Cuff Size: Adult Large)   Pulse 87   Temp 98  F (36.7  C) (Temporal)   Resp 20   Ht 1.562 m (5' 1.5\")   Wt 105 kg (231 lb 6.4 oz)   LMP 10/30/2024 (Exact Date)   SpO2 98%   BMI 43.01 kg/m    Body mass index is 43.01 kg/m .  Physical Exam   GENERAL: alert and no distress  HENT: right ear canal occluded with wax. Left: ear canals and TM's normal, nose and mouth without ulcers or lesions  NECK: no adenopathy, no asymmetry, masses, or scars  RESP: lungs clear to auscultation - no rales, rhonchi or wheezes  CV: regular rate and rhythm, normal S1 S2, no S3 or S4, no murmur, click or rub, no peripheral edema  PSYCH: mentation appears normal, affect normal/bright            Signed Electronically by: KAN Lozano CNP    "

## 2024-12-04 ENCOUNTER — OFFICE VISIT (OUTPATIENT)
Dept: OPTOMETRY | Facility: CLINIC | Age: 29
End: 2024-12-04
Payer: COMMERCIAL

## 2024-12-04 DIAGNOSIS — Z98.890 HISTORY OF STRABISMUS SURGERY: ICD-10-CM

## 2024-12-04 DIAGNOSIS — H52.223 REGULAR ASTIGMATISM OF BOTH EYES: ICD-10-CM

## 2024-12-04 DIAGNOSIS — Z01.01 ENCOUNTER FOR EXAMINATION OF EYES AND VISION WITH ABNORMAL FINDINGS: Primary | ICD-10-CM

## 2024-12-04 DIAGNOSIS — H52.03 HYPERMETROPIA, BILATERAL: ICD-10-CM

## 2024-12-04 PROCEDURE — 92004 COMPRE OPH EXAM NEW PT 1/>: CPT | Performed by: OPTOMETRIST

## 2024-12-04 PROCEDURE — 92015 DETERMINE REFRACTIVE STATE: CPT | Performed by: OPTOMETRIST

## 2024-12-04 ASSESSMENT — CONF VISUAL FIELD
OD_SUPERIOR_NASAL_RESTRICTION: 0
OS_SUPERIOR_TEMPORAL_RESTRICTION: 0
OD_SUPERIOR_TEMPORAL_RESTRICTION: 0
OS_NORMAL: 1
OS_INFERIOR_TEMPORAL_RESTRICTION: 0
OD_INFERIOR_TEMPORAL_RESTRICTION: 0
METHOD: COUNTING FINGERS
OS_SUPERIOR_NASAL_RESTRICTION: 0
OD_INFERIOR_NASAL_RESTRICTION: 0
OD_NORMAL: 1
OS_INFERIOR_NASAL_RESTRICTION: 0

## 2024-12-04 ASSESSMENT — CUP TO DISC RATIO
OS_RATIO: 0.2
OD_RATIO: 0.25

## 2024-12-04 ASSESSMENT — REFRACTION_MANIFEST
OD_CYLINDER: +2.75
OD_AXIS: 065
OS_AXIS: 118
OD_AXIS: 070
OS_CYLINDER: +2.50
OS_CYLINDER: +2.50
OD_SPHERE: +0.75
OS_SPHERE: +1.75
METHOD_AUTOREFRACTION: 1
OD_SPHERE: +0.75
OS_AXIS: 127
OD_CYLINDER: +3.25
OS_SPHERE: +2.00

## 2024-12-04 ASSESSMENT — TONOMETRY
IOP_METHOD: APPLANATION
OD_IOP_MMHG: 15
OS_IOP_MMHG: 15

## 2024-12-04 ASSESSMENT — VISUAL ACUITY
OS_SC: 20/50
OS_SC+: -1
OD_SC+: -1
METHOD: SNELLEN - LINEAR
OS_SC: 20/60
OD_SC: 20/50
OD_SC: 20/40-1

## 2024-12-04 ASSESSMENT — KERATOMETRY
OD_AXISANGLE2_DEGREES: 164
OD_K2POWER_DIOPTERS: 48.50
OS_AXISANGLE_DEGREES: 117
OS_AXISANGLE2_DEGREES: 027
OD_K1POWER_DIOPTERS: 45.50
OS_K1POWER_DIOPTERS: 45.50
OD_AXISANGLE_DEGREES: 074
OS_K2POWER_DIOPTERS: 48.25

## 2024-12-04 ASSESSMENT — SLIT LAMP EXAM - LIDS
COMMENTS: NORMAL
COMMENTS: NORMAL

## 2024-12-04 ASSESSMENT — EXTERNAL EXAM - RIGHT EYE: OD_EXAM: NORMAL

## 2024-12-04 ASSESSMENT — EXTERNAL EXAM - LEFT EYE: OS_EXAM: NORMAL

## 2024-12-04 NOTE — PROGRESS NOTES
Chief Complaint   Patient presents with    Annual Eye Exam    Strabismus Evaluation     -HX of strabismus sx each eye at 9mos old    -Amblyopia left eye - hx of patching      Last Eye Exam: 5+ yrs - [place]   Dilated Previously: Unsure, side effects of dilation explained today    What are you currently using to see?  does not use glasses or contacts - has had glasses in past but lost or broke them 5+ yrs ago     Distance Vision Acuity: Noticed gradual change in both eyes      Near Vision Acuity: Not satisfied     Eye Comfort: watery when tired   Do you use eye drops? : No  Occupation or Hobbies: In school studying criminal justice - ~6 hrs/day on screen     Regina Nelson  Optometry Assistant     Medical, surgical and family histories reviewed and updated 12/4/2024.       OBJECTIVE: See Ophthalmology exam    ASSESSMENT:    ICD-10-CM    1. Encounter for examination of eyes and vision with abnormal findings  Z01.01       2. History of strabismus surgery  Z98.890       3. Hypermetropia, bilateral  H52.03       4. Regular astigmatism of both eyes  H52.223           PLAN:    Carol Wen aware  eye exam results will be sent to Clinic, [facility].  Patient Instructions   Updated glasses prescription provided today.   Allow 2 weeks to adapt to the new glasses.     Return in 2 years for a comprehensive eye exam, or sooner if needed.      The effects of the dilating drops last for 4- 6 hours.  You will be more sensitive to light and vision will be blurry up close.  Mydriatic sunglasses were given if needed.     Boston Willson, OD  [facility]  [address] [place] [address]. [place]  [place]  [address]    [phone]

## 2024-12-04 NOTE — LETTER
12/4/2024      Carol Wen  [address]  [place] [address]      Dear Colleague,    Thank you for referring your patient, Carol Wen, to the [facility]. Please see a copy of my visit note below.    Chief Complaint   Patient presents with     Annual Eye Exam     Strabismus Evaluation     -HX of strabismus sx each eye at 9mos old    -Amblyopia left eye - hx of patching      Last Eye Exam: 5+ yrs - [place]   Dilated Previously: Unsure, side effects of dilation explained today    What are you currently using to see?  does not use glasses or contacts - has had glasses in past but lost or broke them 5+ yrs ago     Distance Vision Acuity: Noticed gradual change in both eyes      Near Vision Acuity: Not satisfied     Eye Comfort: watery when tired   Do you use eye drops? : No  Occupation or Hobbies: In school studying criminal justice - ~6 hrs/day on screen     Regina Nelson  Optometry Assistant     Medical, surgical and family histories reviewed and updated 12/4/2024.       OBJECTIVE: See Ophthalmology exam    ASSESSMENT:    ICD-10-CM    1. Encounter for examination of eyes and vision with abnormal findings  Z01.01       2. History of strabismus surgery  Z98.890       3. Hypermetropia, bilateral  H52.03       4. Regular astigmatism of both eyes  H52.223           PLAN:    Carol Wen aware  eye exam results will be sent to [facility].  Patient Instructions   Updated glasses prescription provided today.   Allow 2 weeks to adapt to the new glasses.     Return in 2 years for a comprehensive eye exam, or sooner if needed.      The effects of the dilating drops last for 4- 6 hours.  You will be more sensitive to light and vision will be blurry up close.  Mydriatic sunglasses were given if needed.     Boston Willson, OD  [facility]  [address]. [place]  [place]  [address] [phone]           Again, thank you for allowing me to  participate in the care of your patient.        Sincerely,        Boston Willson OD

## 2024-12-04 NOTE — PATIENT INSTRUCTIONS
Updated glasses prescription provided today.   Allow 2 weeks to adapt to the new glasses.     Return in 2 years for a comprehensive eye exam, or sooner if needed.      The effects of the dilating drops last for 4- 6 hours.  You will be more sensitive to light and vision will be blurry up close.  Mydriatic sunglasses were given if needed.     Boston Willson, OD  [facility]  [address] [place] [address]. [place]  [address]    [phone]

## 2025-02-04 ENCOUNTER — APPOINTMENT (OUTPATIENT)
Dept: OPTOMETRY | Facility: CLINIC | Age: 30
End: 2025-02-04
Payer: COMMERCIAL

## 2025-02-04 PROCEDURE — 92340 FIT SPECTACLES MONOFOCAL: CPT | Performed by: OPTOMETRIST
